# Patient Record
Sex: FEMALE | Race: WHITE
[De-identification: names, ages, dates, MRNs, and addresses within clinical notes are randomized per-mention and may not be internally consistent; named-entity substitution may affect disease eponyms.]

---

## 2017-06-27 ENCOUNTER — HOSPITAL ENCOUNTER (OUTPATIENT)
Dept: HOSPITAL 8 - CFH | Age: 82
Discharge: HOME | End: 2017-06-27
Attending: INTERNAL MEDICINE
Payer: MEDICARE

## 2017-06-27 DIAGNOSIS — I25.9: Primary | ICD-10-CM

## 2017-06-27 PROCEDURE — A9502 TC99M TETROFOSMIN: HCPCS

## 2017-06-27 PROCEDURE — 78452 HT MUSCLE IMAGE SPECT MULT: CPT

## 2017-06-27 PROCEDURE — 93017 CV STRESS TEST TRACING ONLY: CPT

## 2017-09-18 ENCOUNTER — HOSPITAL ENCOUNTER (OUTPATIENT)
Dept: HOSPITAL 8 - CFH | Age: 82
Discharge: HOME | End: 2017-09-18
Attending: INTERNAL MEDICINE
Payer: MEDICARE

## 2017-09-18 DIAGNOSIS — I48.2: Primary | ICD-10-CM

## 2017-09-18 LAB
HCT VFR BLD CALC: 53.7 % (ref 34.6–47.8)
HGB BLD-MCNC: 17.6 G/DL (ref 11.7–16.4)
WBC # BLD AUTO: 8 X10^3/UL (ref 3.4–10)

## 2017-09-18 PROCEDURE — 36415 COLL VENOUS BLD VENIPUNCTURE: CPT

## 2017-09-18 PROCEDURE — 85025 COMPLETE CBC W/AUTO DIFF WBC: CPT

## 2017-09-21 ENCOUNTER — HOSPITAL ENCOUNTER (OUTPATIENT)
Facility: MEDICAL CENTER | Age: 82
End: 2017-09-21
Attending: INTERNAL MEDICINE | Admitting: INTERNAL MEDICINE
Payer: MEDICARE

## 2017-09-21 VITALS
HEART RATE: 68 BPM | HEIGHT: 64 IN | RESPIRATION RATE: 14 BRPM | SYSTOLIC BLOOD PRESSURE: 149 MMHG | WEIGHT: 148 LBS | BODY MASS INDEX: 25.27 KG/M2 | TEMPERATURE: 99 F | OXYGEN SATURATION: 98 % | DIASTOLIC BLOOD PRESSURE: 64 MMHG

## 2017-09-21 PROBLEM — T82.191A MECHANICAL COMPLICATION OF CARDIAC PACEMAKER: Status: ACTIVE | Noted: 2017-09-21

## 2017-09-21 PROCEDURE — 305387 HCHG SUTURES

## 2017-09-21 PROCEDURE — 304853 HCHG PPM TEST CABLE

## 2017-09-21 PROCEDURE — 700101 HCHG RX REV CODE 250

## 2017-09-21 PROCEDURE — C1786 PMKR, SINGLE, RATE-RESP: HCPCS

## 2017-09-21 PROCEDURE — 160002 HCHG RECOVERY MINUTES (STAT)

## 2017-09-21 PROCEDURE — 306474 HCHG STAPLER,SKIN 35W PREM(CATHLAB)

## 2017-09-21 PROCEDURE — 700111 HCHG RX REV CODE 636 W/ 250 OVERRIDE (IP)

## 2017-09-21 PROCEDURE — 33227 REMOVE&REPLACE PM GEN SINGL: CPT

## 2017-09-21 PROCEDURE — 99152 MOD SED SAME PHYS/QHP 5/>YRS: CPT

## 2017-09-21 PROCEDURE — 304952 HCHG R 2 PADS

## 2017-09-21 RX ORDER — LIDOCAINE HYDROCHLORIDE 20 MG/ML
INJECTION, SOLUTION INFILTRATION; PERINEURAL
Status: COMPLETED
Start: 2017-09-21 | End: 2017-09-21

## 2017-09-21 RX ORDER — MIDAZOLAM HYDROCHLORIDE 1 MG/ML
INJECTION INTRAMUSCULAR; INTRAVENOUS
Status: COMPLETED
Start: 2017-09-21 | End: 2017-09-21

## 2017-09-21 RX ADMIN — LIDOCAINE HYDROCHLORIDE: 20 INJECTION, SOLUTION INFILTRATION; PERINEURAL at 07:23

## 2017-09-21 RX ADMIN — VANCOMYCIN HYDROCHLORIDE 2000 MG: 1 INJECTION, POWDER, LYOPHILIZED, FOR SOLUTION INTRAVENOUS at 08:25

## 2017-09-21 RX ADMIN — MIDAZOLAM 2 MG: 1 INJECTION INTRAMUSCULAR; INTRAVENOUS at 08:38

## 2017-09-21 RX ADMIN — FENTANYL CITRATE 50 MCG: 50 INJECTION, SOLUTION INTRAMUSCULAR; INTRAVENOUS at 08:38

## 2017-09-21 ASSESSMENT — PAIN SCALES - GENERAL
PAINLEVEL_OUTOF10: 0

## 2017-09-21 NOTE — PROCEDURES
DATE OF SERVICE:  09/21/2017    PROCEDURE:  Permanent pacemaker generator change.    INDICATION:  End of service generator.    NARRATIVE:  Patient was brought to the cath lab in a fasting state.  Informed   consent was obtained.  She was prepped and draped in usual sterile fashion.    Conscious sedation was employed.  Lidocaine 1% was used for local anesthesia.    The existing pocket was entered with a combination of sharp, blunt and Bovie   dissection.  The existing pulse generator was then removed from the pocket.    The lead was tested and confirmed of adequate pacing and sensing thresholds.    The pocket was flushed with antibiotic solution.  The lead was connected to   the new pulse generator.  The entire system was placed in the pocket.  The   wound was closed in 2 layers.  Upon completion of the case, all sponge and   needle counts correct.  The patient was taken back to her telemetry bed in   stable condition.    PULSE GENERATOR DATA:  The pulse generator is a Medtronic, serial #[id number].    LEAD DATA:  The ventricular lead is a Medtronic lead.  Date of original   implant is 11/22/2010, serial #[id number].  The R waves measured 18.5 millivolts.    The pacing threshold is 0.4 volts at 0.5 msec.  The right ventricular pacing   lead impedance is 420 ohms.    CONCLUSIONS:  1.  Successful pacemaker generator change.  2.  For program device, please refer to the pacemaker clinic chart.       ____________________________________     MD MAICOL SPAULDING / MEGHAN    DD:  09/21/2017 08:45:40  DT:  09/21/2017 09:00:20    D#:  [id number]  Job#:  [id number]

## 2017-09-21 NOTE — OR NURSING
0900   PATIENT RECEIVED FROM CATH LAB,  S/P GENERATOR CHANGED.  RIGHT SC INCISION WITH DRESSING INTACT,  VERY SMALL SPOT OF BLOOD DRAINAGE.   IS IN WAITING ROOM.    1000    PATIENT TAKING PO FLUID AND SNACK WELL.      1100  DC INSTRUCTIONS GIVEN TO PATIENT.  VERBALIZED UNDERSTANDING OF ALL.  HL DC.  PATIENT DC TO HOME,  VIA WC,  ESCORTED OUT BY VOLUNTEER.

## 2017-09-21 NOTE — DISCHARGE INSTRUCTIONS
ACTIVITY: Rest and take it easy for the first 24 hours.  A responsible adult is recommended to remain with you during that time.  It is normal to feel sleepy.  We encourage you to not do anything that requires balance, judgment or coordination.    MILD FLU-LIKE SYMPTOMS ARE NORMAL. YOU MAY EXPERIENCE GENERALIZED MUSCLE ACHES, THROAT IRRITATION, HEADACHE AND/OR SOME NAUSEA.    FOR 24 HOURS DO NOT:  Drive, operate machinery or run household appliances.  Drink beer or alcoholic beverages.   Make important decisions or sign legal documents.    SPECIAL INSTRUCTIONS: *KEEP INCISION DRY FOR 7 DAYS**    DIET: To avoid nausea, slowly advance diet as tolerated, avoiding spicy or greasy foods for the first day.  Add more substantial food to your diet according to your physician's instructions.  Babies can be fed formula or breast milk as soon as they are hungry.  INCREASE FLUIDS AND FIBER TO AVOID CONSTIPATION.    SURGICAL DRESSING/BATHING: *MAY SHOWER BUT NEED TO COVER UP SARAN WRAP**    FOLLOW-UP APPOINTMENT:  A follow-up appointment should be arranged with your doctor in *[facility]    [phone]**; call to schedule.    You should CALL YOUR PHYSICIAN if you develop:  Fever greater than 101 degrees F.  Pain not relieved by medication, or persistent nausea or vomiting.  Excessive bleeding (blood soaking through dressing) or unexpected drainage from the wound.  Extreme redness or swelling around the incision site, drainage of pus or foul smelling drainage.  Inability to urinate or empty your bladder within 8 hours.  Problems with breathing or chest pain.    You should call 911 if you develop problems with breathing or chest pain.  If you are unable to contact your doctor or surgical center, you should go to the nearest emergency room or urgent care center.  Physician's telephone #: *[phone]**    If any questions arise, call your doctor.  If your doctor is not available, please feel free to call the Surgical Center at  [phone].  The Center is open Monday through Friday from 7AM to 7PM.  You can also call the HEALTH HOTLINE open 24 hours/day, 7 days/week and speak to a nurse at [phone], or toll free at [phone].    A registered nurse may call you a few days after your surgery to see how you are doing after your procedure.    MEDICATIONS: Resume taking daily medication.  Take prescribed pain medication with food.  If no medication is prescribed, you may take non-aspirin pain medication if needed.  PAIN MEDICATION CAN BE VERY CONSTIPATING.  Take a stool softener or laxative such as senokot, pericolace, or milk of magnesia if needed.      If your physician has prescribed pain medication that includes Acetaminophen (Tylenol), do not take additional Acetaminophen (Tylenol) while taking the prescribed medication.    Depression / Suicide Risk    As you are discharged from this [facility] facility, it is important to learn how to keep safe from harming yourself.    Recognize the warning signs:  · Abrupt changes in personality, positive or negative- including increase in energy   · Giving away possessions  · Change in eating patterns- significant weight changes-  positive or negative  · Change in sleeping patterns- unable to sleep or sleeping all the time   · Unwillingness or inability to communicate  · Depression  · Unusual sadness, discouragement and loneliness  · Talk of wanting to die  · Neglect of personal appearance   · Rebelliousness- reckless behavior  · Withdrawal from people/activities they love  · Confusion- inability to concentrate     If you or a loved one observes any of these behaviors or has concerns about self-harm, here's what you can do:  · Talk about it- your feelings and reasons for harming yourself  · Remove any means that you might use to hurt yourself (examples: pills, rope, extension cords, firearm)  · Get professional help from the community (Mental Health, Substance Abuse, psychological  counseling)  · Do not be alone:Call your Safe Contact- someone whom you trust who will be there for you.  · Call your local CRISIS HOTLINE 379-7808 or 188-325-5949  · Call your local Children's Mobile Crisis Response Team [place] (752) 597-1903 or www.[employer]  · Call the toll free National Suicide Prevention Hotlines   · National Suicide Prevention Lifeline 637-149-PIZQ (9128)  · National Hope Line Network 800-SUICIDE (610-8419)

## 2018-07-10 ENCOUNTER — HOSPITAL ENCOUNTER (OUTPATIENT)
Dept: HOSPITAL 8 - CFH | Age: 83
End: 2018-07-10
Attending: INTERNAL MEDICINE
Payer: MEDICARE

## 2018-07-10 DIAGNOSIS — C50.919: ICD-10-CM

## 2018-07-10 DIAGNOSIS — I08.1: Primary | ICD-10-CM

## 2018-07-10 DIAGNOSIS — I10: ICD-10-CM

## 2018-07-10 DIAGNOSIS — I48.91: ICD-10-CM

## 2018-07-10 DIAGNOSIS — I47.2: ICD-10-CM

## 2018-07-10 PROCEDURE — 93306 TTE W/DOPPLER COMPLETE: CPT

## 2020-10-23 ENCOUNTER — APPOINTMENT (OUTPATIENT)
Dept: RADIOLOGY | Facility: MEDICAL CENTER | Age: 85
DRG: 481 | End: 2020-10-23
Attending: EMERGENCY MEDICINE
Payer: MEDICARE

## 2020-10-23 ENCOUNTER — APPOINTMENT (OUTPATIENT)
Dept: RADIOLOGY | Facility: MEDICAL CENTER | Age: 85
DRG: 481 | End: 2020-10-23
Attending: STUDENT IN AN ORGANIZED HEALTH CARE EDUCATION/TRAINING PROGRAM
Payer: MEDICARE

## 2020-10-23 ENCOUNTER — HOSPITAL ENCOUNTER (OUTPATIENT)
Dept: RADIOLOGY | Facility: MEDICAL CENTER | Age: 85
End: 2020-10-23
Payer: MEDICARE

## 2020-10-23 ENCOUNTER — HOSPITAL ENCOUNTER (INPATIENT)
Facility: MEDICAL CENTER | Age: 85
LOS: 4 days | DRG: 481 | End: 2020-10-27
Attending: EMERGENCY MEDICINE | Admitting: STUDENT IN AN ORGANIZED HEALTH CARE EDUCATION/TRAINING PROGRAM
Payer: MEDICARE

## 2020-10-23 ENCOUNTER — ANESTHESIA (OUTPATIENT)
Dept: SURGERY | Facility: MEDICAL CENTER | Age: 85
DRG: 481 | End: 2020-10-23
Payer: MEDICARE

## 2020-10-23 ENCOUNTER — ANESTHESIA EVENT (OUTPATIENT)
Dept: SURGERY | Facility: MEDICAL CENTER | Age: 85
DRG: 481 | End: 2020-10-23
Payer: MEDICARE

## 2020-10-23 DIAGNOSIS — S72.001A CLOSED FRACTURE OF RIGHT HIP, INITIAL ENCOUNTER (HCC): Primary | ICD-10-CM

## 2020-10-23 DIAGNOSIS — S72.141A CLOSED COMMINUTED INTERTROCHANTERIC FRACTURE OF RIGHT FEMUR, INITIAL ENCOUNTER (HCC): ICD-10-CM

## 2020-10-23 PROBLEM — J44.9 CHRONIC OBSTRUCTIVE PULMONARY DISEASE (COPD) (HCC): Status: ACTIVE | Noted: 2020-10-23

## 2020-10-23 LAB
ABO GROUP BLD: NORMAL
ALBUMIN SERPL BCP-MCNC: 3.7 G/DL (ref 3.2–4.9)
ALBUMIN/GLOB SERPL: 1.3 G/DL
ALP SERPL-CCNC: 57 U/L (ref 30–99)
ALT SERPL-CCNC: 20 U/L (ref 2–50)
ANION GAP SERPL CALC-SCNC: 10 MMOL/L (ref 7–16)
AST SERPL-CCNC: 22 U/L (ref 12–45)
BASOPHILS # BLD AUTO: 0.2 % (ref 0–1.8)
BASOPHILS # BLD: 0.03 K/UL (ref 0–0.12)
BILIRUB SERPL-MCNC: 0.6 MG/DL (ref 0.1–1.5)
BLD GP AB SCN SERPL QL: NORMAL
BUN SERPL-MCNC: 10 MG/DL (ref 8–22)
CALCIUM SERPL-MCNC: 9.3 MG/DL (ref 8.5–10.5)
CHLORIDE SERPL-SCNC: 104 MMOL/L (ref 96–112)
CO2 SERPL-SCNC: 27 MMOL/L (ref 20–33)
COVID ORDER STATUS COVID19: NORMAL
COVID ORDER STATUS COVID19: NORMAL
CREAT SERPL-MCNC: 0.37 MG/DL (ref 0.5–1.4)
DIGOXIN SERPL-MCNC: 0.6 NG/ML (ref 0.8–2)
EKG IMPRESSION: NORMAL
EOSINOPHIL # BLD AUTO: 0.03 K/UL (ref 0–0.51)
EOSINOPHIL NFR BLD: 0.2 % (ref 0–6.9)
ERYTHROCYTE [DISTWIDTH] IN BLOOD BY AUTOMATED COUNT: 53.7 FL (ref 35.9–50)
GLOBULIN SER CALC-MCNC: 2.9 G/DL (ref 1.9–3.5)
GLUCOSE SERPL-MCNC: 110 MG/DL (ref 65–99)
HCT VFR BLD AUTO: 51.9 % (ref 37–47)
HGB BLD-MCNC: 16.2 G/DL (ref 12–16)
IMM GRANULOCYTES # BLD AUTO: 0.05 K/UL (ref 0–0.11)
IMM GRANULOCYTES NFR BLD AUTO: 0.4 % (ref 0–0.9)
LYMPHOCYTES # BLD AUTO: 0.68 K/UL (ref 1–4.8)
LYMPHOCYTES NFR BLD: 5.5 % (ref 22–41)
MAGNESIUM SERPL-MCNC: 2.1 MG/DL (ref 1.5–2.5)
MCH RBC QN AUTO: 29.9 PG (ref 27–33)
MCHC RBC AUTO-ENTMCNC: 31.2 G/DL (ref 33.6–35)
MCV RBC AUTO: 95.9 FL (ref 81.4–97.8)
MONOCYTES # BLD AUTO: 0.74 K/UL (ref 0–0.85)
MONOCYTES NFR BLD AUTO: 5.9 % (ref 0–13.4)
NEUTROPHILS # BLD AUTO: 10.92 K/UL (ref 2–7.15)
NEUTROPHILS NFR BLD: 87.8 % (ref 44–72)
NRBC # BLD AUTO: 0 K/UL
NRBC BLD-RTO: 0 /100 WBC
NT-PROBNP SERPL IA-MCNC: 879 PG/ML (ref 0–125)
PLATELET # BLD AUTO: 202 K/UL (ref 164–446)
PMV BLD AUTO: 9.9 FL (ref 9–12.9)
POTASSIUM SERPL-SCNC: 4.5 MMOL/L (ref 3.6–5.5)
PROT SERPL-MCNC: 6.6 G/DL (ref 6–8.2)
RBC # BLD AUTO: 5.41 M/UL (ref 4.2–5.4)
RH BLD: NORMAL
SARS-COV+SARS-COV-2 AG RESP QL IA.RAPID: NOTDETECTED
SARS-COV-2 RNA RESP QL NAA+PROBE: NOTDETECTED
SODIUM SERPL-SCNC: 141 MMOL/L (ref 135–145)
SPECIMEN SOURCE: NORMAL
SPECIMEN SOURCE: NORMAL
WBC # BLD AUTO: 12.5 K/UL (ref 4.8–10.8)

## 2020-10-23 PROCEDURE — 160002 HCHG RECOVERY MINUTES (STAT): Performed by: STUDENT IN AN ORGANIZED HEALTH CARE EDUCATION/TRAINING PROGRAM

## 2020-10-23 PROCEDURE — 99285 EMERGENCY DEPT VISIT HI MDM: CPT

## 2020-10-23 PROCEDURE — 770006 HCHG ROOM/CARE - MED/SURG/GYN SEMI*

## 2020-10-23 PROCEDURE — 93005 ELECTROCARDIOGRAM TRACING: CPT | Performed by: EMERGENCY MEDICINE

## 2020-10-23 PROCEDURE — 160041 HCHG SURGERY MINUTES - EA ADDL 1 MIN LEVEL 4: Performed by: STUDENT IN AN ORGANIZED HEALTH CARE EDUCATION/TRAINING PROGRAM

## 2020-10-23 PROCEDURE — 160048 HCHG OR STATISTICAL LEVEL 1-5: Performed by: STUDENT IN AN ORGANIZED HEALTH CARE EDUCATION/TRAINING PROGRAM

## 2020-10-23 PROCEDURE — 501838 HCHG SUTURE GENERAL: Performed by: STUDENT IN AN ORGANIZED HEALTH CARE EDUCATION/TRAINING PROGRAM

## 2020-10-23 PROCEDURE — 36415 COLL VENOUS BLD VENIPUNCTURE: CPT

## 2020-10-23 PROCEDURE — 502000 HCHG MISC OR IMPLANTS RC 0278: Performed by: STUDENT IN AN ORGANIZED HEALTH CARE EDUCATION/TRAINING PROGRAM

## 2020-10-23 PROCEDURE — 160036 HCHG PACU - EA ADDL 30 MINS PHASE I: Performed by: STUDENT IN AN ORGANIZED HEALTH CARE EDUCATION/TRAINING PROGRAM

## 2020-10-23 PROCEDURE — 86900 BLOOD TYPING SEROLOGIC ABO: CPT

## 2020-10-23 PROCEDURE — 99221 1ST HOSP IP/OBS SF/LOW 40: CPT | Mod: AI | Performed by: STUDENT IN AN ORGANIZED HEALTH CARE EDUCATION/TRAINING PROGRAM

## 2020-10-23 PROCEDURE — 700101 HCHG RX REV CODE 250: Performed by: ANESTHESIOLOGY

## 2020-10-23 PROCEDURE — 73552 X-RAY EXAM OF FEMUR 2/>: CPT | Mod: RT

## 2020-10-23 PROCEDURE — 85025 COMPLETE CBC W/AUTO DIFF WBC: CPT

## 2020-10-23 PROCEDURE — 72170 X-RAY EXAM OF PELVIS: CPT

## 2020-10-23 PROCEDURE — U0003 INFECTIOUS AGENT DETECTION BY NUCLEIC ACID (DNA OR RNA); SEVERE ACUTE RESPIRATORY SYNDROME CORONAVIRUS 2 (SARS-COV-2) (CORONAVIRUS DISEASE [COVID-19]), AMPLIFIED PROBE TECHNIQUE, MAKING USE OF HIGH THROUGHPUT TECHNOLOGIES AS DESCRIBED BY CMS-2020-01-R: HCPCS

## 2020-10-23 PROCEDURE — 70450 CT HEAD/BRAIN W/O DYE: CPT

## 2020-10-23 PROCEDURE — 87426 SARSCOV CORONAVIRUS AG IA: CPT

## 2020-10-23 PROCEDURE — 0QS636Z REPOSITION RIGHT UPPER FEMUR WITH INTRAMEDULLARY INTERNAL FIXATION DEVICE, PERCUTANEOUS APPROACH: ICD-10-PCS | Performed by: STUDENT IN AN ORGANIZED HEALTH CARE EDUCATION/TRAINING PROGRAM

## 2020-10-23 PROCEDURE — 80162 ASSAY OF DIGOXIN TOTAL: CPT

## 2020-10-23 PROCEDURE — 86850 RBC ANTIBODY SCREEN: CPT

## 2020-10-23 PROCEDURE — 94760 N-INVAS EAR/PLS OXIMETRY 1: CPT

## 2020-10-23 PROCEDURE — 160035 HCHG PACU - 1ST 60 MINS PHASE I: Performed by: STUDENT IN AN ORGANIZED HEALTH CARE EDUCATION/TRAINING PROGRAM

## 2020-10-23 PROCEDURE — C1713 ANCHOR/SCREW BN/BN,TIS/BN: HCPCS | Performed by: STUDENT IN AN ORGANIZED HEALTH CARE EDUCATION/TRAINING PROGRAM

## 2020-10-23 PROCEDURE — 700105 HCHG RX REV CODE 258: Performed by: ANESTHESIOLOGY

## 2020-10-23 PROCEDURE — 160029 HCHG SURGERY MINUTES - 1ST 30 MINS LEVEL 4: Performed by: STUDENT IN AN ORGANIZED HEALTH CARE EDUCATION/TRAINING PROGRAM

## 2020-10-23 PROCEDURE — 160009 HCHG ANES TIME/MIN: Performed by: STUDENT IN AN ORGANIZED HEALTH CARE EDUCATION/TRAINING PROGRAM

## 2020-10-23 PROCEDURE — 83880 ASSAY OF NATRIURETIC PEPTIDE: CPT

## 2020-10-23 PROCEDURE — 71045 X-RAY EXAM CHEST 1 VIEW: CPT

## 2020-10-23 PROCEDURE — 93010 ELECTROCARDIOGRAM REPORT: CPT | Performed by: INTERNAL MEDICINE

## 2020-10-23 PROCEDURE — 80053 COMPREHEN METABOLIC PANEL: CPT

## 2020-10-23 PROCEDURE — 86901 BLOOD TYPING SEROLOGIC RH(D): CPT

## 2020-10-23 PROCEDURE — C9803 HOPD COVID-19 SPEC COLLECT: HCPCS | Performed by: INTERNAL MEDICINE

## 2020-10-23 PROCEDURE — 700111 HCHG RX REV CODE 636 W/ 250 OVERRIDE (IP): Performed by: ANESTHESIOLOGY

## 2020-10-23 PROCEDURE — 83735 ASSAY OF MAGNESIUM: CPT

## 2020-10-23 PROCEDURE — C9803 HOPD COVID-19 SPEC COLLECT: HCPCS | Performed by: STUDENT IN AN ORGANIZED HEALTH CARE EDUCATION/TRAINING PROGRAM

## 2020-10-23 PROCEDURE — 502240 HCHG MISC OR SUPPLY RC 0272: Performed by: STUDENT IN AN ORGANIZED HEALTH CARE EDUCATION/TRAINING PROGRAM

## 2020-10-23 DEVICE — SCREW FOR IM NAILS TI LOCKING WITH T25 STARDRIVE 5.0MM 32MM (2TX2=4): Type: IMPLANTABLE DEVICE | Site: HIP | Status: FUNCTIONAL

## 2020-10-23 DEVICE — NAIL STERILE TI CANN TFNA 10MM 125 DEG 170MM: Type: IMPLANTABLE DEVICE | Site: HIP | Status: FUNCTIONAL

## 2020-10-23 DEVICE — IMPLANTABLE DEVICE: Type: IMPLANTABLE DEVICE | Site: HIP | Status: FUNCTIONAL

## 2020-10-23 RX ORDER — CEFAZOLIN SODIUM 2 G/100ML
2 INJECTION, SOLUTION INTRAVENOUS EVERY 8 HOURS
Status: COMPLETED | OUTPATIENT
Start: 2020-10-24 | End: 2020-10-24

## 2020-10-23 RX ORDER — CEFAZOLIN SODIUM 1 G/3ML
INJECTION, POWDER, FOR SOLUTION INTRAMUSCULAR; INTRAVENOUS PRN
Status: DISCONTINUED | OUTPATIENT
Start: 2020-10-23 | End: 2020-10-23 | Stop reason: SURG

## 2020-10-23 RX ORDER — SODIUM CHLORIDE, SODIUM LACTATE, POTASSIUM CHLORIDE, CALCIUM CHLORIDE 600; 310; 30; 20 MG/100ML; MG/100ML; MG/100ML; MG/100ML
INJECTION, SOLUTION INTRAVENOUS
Status: DISCONTINUED | OUTPATIENT
Start: 2020-10-23 | End: 2020-10-23 | Stop reason: SURG

## 2020-10-23 RX ORDER — HALOPERIDOL 5 MG/ML
INJECTION INTRAMUSCULAR PRN
Status: DISCONTINUED | OUTPATIENT
Start: 2020-10-23 | End: 2020-10-23 | Stop reason: SURG

## 2020-10-23 RX ORDER — FUROSEMIDE 20 MG/1
20 TABLET ORAL DAILY
Status: DISCONTINUED | OUTPATIENT
Start: 2020-10-24 | End: 2020-10-27 | Stop reason: HOSPADM

## 2020-10-23 RX ORDER — ROCURONIUM BROMIDE 10 MG/ML
INJECTION, SOLUTION INTRAVENOUS PRN
Status: DISCONTINUED | OUTPATIENT
Start: 2020-10-23 | End: 2020-10-23 | Stop reason: SURG

## 2020-10-23 RX ORDER — HYDROMORPHONE HYDROCHLORIDE 1 MG/ML
0.2 INJECTION, SOLUTION INTRAMUSCULAR; INTRAVENOUS; SUBCUTANEOUS
Status: DISCONTINUED | OUTPATIENT
Start: 2020-10-23 | End: 2020-10-24 | Stop reason: HOSPADM

## 2020-10-23 RX ORDER — HYDROMORPHONE HYDROCHLORIDE 1 MG/ML
0.4 INJECTION, SOLUTION INTRAMUSCULAR; INTRAVENOUS; SUBCUTANEOUS
Status: DISCONTINUED | OUTPATIENT
Start: 2020-10-23 | End: 2020-10-24 | Stop reason: HOSPADM

## 2020-10-23 RX ORDER — DIGOXIN 125 MCG
125 TABLET ORAL
Status: DISCONTINUED | OUTPATIENT
Start: 2020-10-24 | End: 2020-10-27 | Stop reason: HOSPADM

## 2020-10-23 RX ORDER — IPRATROPIUM BROMIDE AND ALBUTEROL SULFATE 2.5; .5 MG/3ML; MG/3ML
3 SOLUTION RESPIRATORY (INHALATION)
Status: DISCONTINUED | OUTPATIENT
Start: 2020-10-23 | End: 2020-10-24 | Stop reason: HOSPADM

## 2020-10-23 RX ORDER — POLYETHYLENE GLYCOL 3350 17 G/17G
1 POWDER, FOR SOLUTION ORAL
Status: DISCONTINUED | OUTPATIENT
Start: 2020-10-23 | End: 2020-10-27 | Stop reason: HOSPADM

## 2020-10-23 RX ORDER — HYDROMORPHONE HYDROCHLORIDE 1 MG/ML
0.5 INJECTION, SOLUTION INTRAMUSCULAR; INTRAVENOUS; SUBCUTANEOUS
Status: DISCONTINUED | OUTPATIENT
Start: 2020-10-23 | End: 2020-10-27 | Stop reason: HOSPADM

## 2020-10-23 RX ORDER — DIGOXIN 250 MCG
250 TABLET ORAL
Status: DISCONTINUED | OUTPATIENT
Start: 2020-10-23 | End: 2020-10-27 | Stop reason: HOSPADM

## 2020-10-23 RX ORDER — HYDROMORPHONE HYDROCHLORIDE 1 MG/ML
0.1 INJECTION, SOLUTION INTRAMUSCULAR; INTRAVENOUS; SUBCUTANEOUS
Status: DISCONTINUED | OUTPATIENT
Start: 2020-10-23 | End: 2020-10-24 | Stop reason: HOSPADM

## 2020-10-23 RX ORDER — ONDANSETRON 2 MG/ML
4 INJECTION INTRAMUSCULAR; INTRAVENOUS
Status: DISCONTINUED | OUTPATIENT
Start: 2020-10-23 | End: 2020-10-24 | Stop reason: HOSPADM

## 2020-10-23 RX ORDER — LISINOPRIL 20 MG/1
20 TABLET ORAL 2 TIMES DAILY
Status: DISCONTINUED | OUTPATIENT
Start: 2020-10-23 | End: 2020-10-24

## 2020-10-23 RX ORDER — BISACODYL 10 MG
10 SUPPOSITORY, RECTAL RECTAL
Status: DISCONTINUED | OUTPATIENT
Start: 2020-10-23 | End: 2020-10-27 | Stop reason: HOSPADM

## 2020-10-23 RX ORDER — ACETAMINOPHEN 325 MG/1
650 TABLET ORAL EVERY 6 HOURS PRN
Status: DISCONTINUED | OUTPATIENT
Start: 2020-10-23 | End: 2020-10-27 | Stop reason: HOSPADM

## 2020-10-23 RX ORDER — DILTIAZEM HYDROCHLORIDE 240 MG/1
240 CAPSULE, COATED, EXTENDED RELEASE ORAL EVERY EVENING
Status: DISCONTINUED | OUTPATIENT
Start: 2020-10-23 | End: 2020-10-24

## 2020-10-23 RX ORDER — LIDOCAINE HYDROCHLORIDE 20 MG/ML
INJECTION, SOLUTION EPIDURAL; INFILTRATION; INTRACAUDAL; PERINEURAL PRN
Status: DISCONTINUED | OUTPATIENT
Start: 2020-10-23 | End: 2020-10-23 | Stop reason: SURG

## 2020-10-23 RX ORDER — AMOXICILLIN 250 MG
2 CAPSULE ORAL 2 TIMES DAILY
Status: DISCONTINUED | OUTPATIENT
Start: 2020-10-23 | End: 2020-10-27 | Stop reason: HOSPADM

## 2020-10-23 RX ORDER — PHENYLEPHRINE HCL IN 0.9% NACL 0.5 MG/5ML
SYRINGE (ML) INTRAVENOUS PRN
Status: DISCONTINUED | OUTPATIENT
Start: 2020-10-23 | End: 2020-10-23 | Stop reason: SURG

## 2020-10-23 RX ORDER — ONDANSETRON 2 MG/ML
4 INJECTION INTRAMUSCULAR; INTRAVENOUS ONCE
Status: COMPLETED | OUTPATIENT
Start: 2020-10-23 | End: 2020-10-24

## 2020-10-23 RX ORDER — OXYCODONE HCL 5 MG/5 ML
5 SOLUTION, ORAL ORAL
Status: DISCONTINUED | OUTPATIENT
Start: 2020-10-23 | End: 2020-10-24 | Stop reason: HOSPADM

## 2020-10-23 RX ORDER — OXYCODONE HCL 5 MG/5 ML
10 SOLUTION, ORAL ORAL
Status: DISCONTINUED | OUTPATIENT
Start: 2020-10-23 | End: 2020-10-24 | Stop reason: HOSPADM

## 2020-10-23 RX ORDER — HALOPERIDOL 5 MG/ML
1 INJECTION INTRAMUSCULAR
Status: DISCONTINUED | OUTPATIENT
Start: 2020-10-23 | End: 2020-10-24 | Stop reason: HOSPADM

## 2020-10-23 RX ADMIN — Medication 200 MCG: at 21:34

## 2020-10-23 RX ADMIN — CEFAZOLIN 2 G: 330 INJECTION, POWDER, FOR SOLUTION INTRAMUSCULAR; INTRAVENOUS at 21:23

## 2020-10-23 RX ADMIN — Medication 300 MCG: at 21:38

## 2020-10-23 RX ADMIN — FENTANYL CITRATE 250 MCG: 50 INJECTION, SOLUTION INTRAMUSCULAR; INTRAVENOUS at 21:45

## 2020-10-23 RX ADMIN — PROPOFOL 100 MG: 10 INJECTION, EMULSION INTRAVENOUS at 21:23

## 2020-10-23 RX ADMIN — ROCURONIUM BROMIDE 50 MG: 10 INJECTION, SOLUTION INTRAVENOUS at 21:30

## 2020-10-23 RX ADMIN — PROPOFOL 100 MG: 10 INJECTION, EMULSION INTRAVENOUS at 21:30

## 2020-10-23 RX ADMIN — HALOPERIDOL LACTATE 1 MG: 5 INJECTION, SOLUTION INTRAMUSCULAR at 21:23

## 2020-10-23 RX ADMIN — PHENYLEPHRINE HYDROCHLORIDE 80 MCG/MIN: 10 INJECTION INTRAVENOUS at 21:38

## 2020-10-23 RX ADMIN — LIDOCAINE HYDROCHLORIDE 60 MG: 20 INJECTION, SOLUTION EPIDURAL; INFILTRATION; INTRACAUDAL at 21:23

## 2020-10-23 RX ADMIN — SUGAMMADEX 200 MG: 100 INJECTION, SOLUTION INTRAVENOUS at 23:13

## 2020-10-23 RX ADMIN — Medication 200 MCG: at 21:33

## 2020-10-23 RX ADMIN — SODIUM CHLORIDE, POTASSIUM CHLORIDE, SODIUM LACTATE AND CALCIUM CHLORIDE: 600; 310; 30; 20 INJECTION, SOLUTION INTRAVENOUS at 21:20

## 2020-10-23 ASSESSMENT — PAIN SCALES - GENERAL: PAIN_LEVEL: 1

## 2020-10-24 PROBLEM — I50.9 CHF (CONGESTIVE HEART FAILURE) (HCC): Status: ACTIVE | Noted: 2020-10-24

## 2020-10-24 LAB
ABO + RH BLD: NORMAL
ERYTHROCYTE [DISTWIDTH] IN BLOOD BY AUTOMATED COUNT: 54.7 FL (ref 35.9–50)
HCT VFR BLD AUTO: 43.9 % (ref 37–47)
HGB BLD-MCNC: 13.7 G/DL (ref 12–16)
MCH RBC QN AUTO: 30.6 PG (ref 27–33)
MCHC RBC AUTO-ENTMCNC: 31.2 G/DL (ref 33.6–35)
MCV RBC AUTO: 98 FL (ref 81.4–97.8)
PLATELET # BLD AUTO: 197 K/UL (ref 164–446)
PMV BLD AUTO: 9.9 FL (ref 9–12.9)
RBC # BLD AUTO: 4.48 M/UL (ref 4.2–5.4)
WBC # BLD AUTO: 14.5 K/UL (ref 4.8–10.8)

## 2020-10-24 PROCEDURE — A9270 NON-COVERED ITEM OR SERVICE: HCPCS | Performed by: STUDENT IN AN ORGANIZED HEALTH CARE EDUCATION/TRAINING PROGRAM

## 2020-10-24 PROCEDURE — 97165 OT EVAL LOW COMPLEX 30 MIN: CPT

## 2020-10-24 PROCEDURE — 700111 HCHG RX REV CODE 636 W/ 250 OVERRIDE (IP): Performed by: STUDENT IN AN ORGANIZED HEALTH CARE EDUCATION/TRAINING PROGRAM

## 2020-10-24 PROCEDURE — 90662 IIV NO PRSV INCREASED AG IM: CPT | Performed by: STUDENT IN AN ORGANIZED HEALTH CARE EDUCATION/TRAINING PROGRAM

## 2020-10-24 PROCEDURE — 3E02340 INTRODUCTION OF INFLUENZA VACCINE INTO MUSCLE, PERCUTANEOUS APPROACH: ICD-10-PCS | Performed by: STUDENT IN AN ORGANIZED HEALTH CARE EDUCATION/TRAINING PROGRAM

## 2020-10-24 PROCEDURE — 700105 HCHG RX REV CODE 258

## 2020-10-24 PROCEDURE — 700102 HCHG RX REV CODE 250 W/ 637 OVERRIDE(OP): Performed by: STUDENT IN AN ORGANIZED HEALTH CARE EDUCATION/TRAINING PROGRAM

## 2020-10-24 PROCEDURE — 99232 SBSQ HOSP IP/OBS MODERATE 35: CPT | Performed by: STUDENT IN AN ORGANIZED HEALTH CARE EDUCATION/TRAINING PROGRAM

## 2020-10-24 PROCEDURE — 73502 X-RAY EXAM HIP UNI 2-3 VIEWS: CPT | Mod: RT

## 2020-10-24 PROCEDURE — 85027 COMPLETE CBC AUTOMATED: CPT

## 2020-10-24 PROCEDURE — 36415 COLL VENOUS BLD VENIPUNCTURE: CPT

## 2020-10-24 PROCEDURE — 700111 HCHG RX REV CODE 636 W/ 250 OVERRIDE (IP): Performed by: EMERGENCY MEDICINE

## 2020-10-24 PROCEDURE — 97162 PT EVAL MOD COMPLEX 30 MIN: CPT

## 2020-10-24 PROCEDURE — 770006 HCHG ROOM/CARE - MED/SURG/GYN SEMI*

## 2020-10-24 PROCEDURE — 90471 IMMUNIZATION ADMIN: CPT

## 2020-10-24 RX ORDER — LEVALBUTEROL INHALATION SOLUTION 0.63 MG/3ML
0.63 SOLUTION RESPIRATORY (INHALATION)
Status: DISCONTINUED | OUTPATIENT
Start: 2020-10-24 | End: 2020-10-27 | Stop reason: HOSPADM

## 2020-10-24 RX ORDER — SODIUM CHLORIDE 9 MG/ML
INJECTION, SOLUTION INTRAVENOUS
Status: COMPLETED
Start: 2020-10-24 | End: 2020-10-24

## 2020-10-24 RX ADMIN — ACETAMINOPHEN 650 MG: 325 TABLET, FILM COATED ORAL at 14:40

## 2020-10-24 RX ADMIN — CEFAZOLIN SODIUM 2 G: 2 INJECTION, SOLUTION INTRAVENOUS at 23:09

## 2020-10-24 RX ADMIN — LEVOTHYROXINE SODIUM 100 MCG: 25 TABLET ORAL at 05:15

## 2020-10-24 RX ADMIN — UMECLIDINIUM BROMIDE AND VILANTEROL TRIFENATATE 1 PUFF: 62.5; 25 POWDER RESPIRATORY (INHALATION) at 05:14

## 2020-10-24 RX ADMIN — SODIUM CHLORIDE 500 ML: 9 INJECTION, SOLUTION INTRAVENOUS at 23:17

## 2020-10-24 RX ADMIN — ACETAMINOPHEN 650 MG: 325 TABLET, FILM COATED ORAL at 21:01

## 2020-10-24 RX ADMIN — ACETAMINOPHEN 650 MG: 325 TABLET, FILM COATED ORAL at 05:56

## 2020-10-24 RX ADMIN — CEFAZOLIN SODIUM 2 G: 2 INJECTION, SOLUTION INTRAVENOUS at 05:13

## 2020-10-24 RX ADMIN — DIGOXIN 125 MCG: 125 TABLET ORAL at 17:04

## 2020-10-24 RX ADMIN — FUROSEMIDE 20 MG: 20 TABLET ORAL at 05:14

## 2020-10-24 RX ADMIN — INFLUENZA A VIRUS A/MICHIGAN/45/2015 X-275 (H1N1) ANTIGEN (FORMALDEHYDE INACTIVATED), INFLUENZA A VIRUS A/SINGAPORE/INFIMH-16-0019/2016 IVR-186 (H3N2) ANTIGEN (FORMALDEHYDE INACTIVATED), INFLUENZA B VIRUS B/PHUKET/3073/2013 ANTIGEN (FORMALDEHYDE INACTIVATED), AND INFLUENZA B VIRUS B/MARYLAND/15/2016 BX-69A ANTIGEN (FORMALDEHYDE INACTIVATED) 0.7 ML: 60; 60; 60; 60 INJECTION, SUSPENSION INTRAMUSCULAR at 21:02

## 2020-10-24 RX ADMIN — RIVAROXABAN 20 MG: 20 TABLET, FILM COATED ORAL at 17:03

## 2020-10-24 RX ADMIN — DOCUSATE SODIUM 50 MG AND SENNOSIDES 8.6 MG 2 TABLET: 8.6; 5 TABLET, FILM COATED ORAL at 05:14

## 2020-10-24 RX ADMIN — DOCUSATE SODIUM 50 MG AND SENNOSIDES 8.6 MG 2 TABLET: 8.6; 5 TABLET, FILM COATED ORAL at 17:04

## 2020-10-24 RX ADMIN — CEFAZOLIN SODIUM 2 G: 2 INJECTION, SOLUTION INTRAVENOUS at 14:41

## 2020-10-24 RX ADMIN — ONDANSETRON 4 MG: 2 INJECTION INTRAMUSCULAR; INTRAVENOUS at 01:53

## 2020-10-24 RX ADMIN — HYDROMORPHONE HYDROCHLORIDE 0.5 MG: 1 INJECTION, SOLUTION INTRAMUSCULAR; INTRAVENOUS; SUBCUTANEOUS at 01:11

## 2020-10-24 SDOH — ECONOMIC STABILITY: FOOD INSECURITY: WITHIN THE PAST 12 MONTHS, YOU WORRIED THAT YOUR FOOD WOULD RUN OUT BEFORE YOU GOT MONEY TO BUY MORE.: NEVER TRUE

## 2020-10-24 SDOH — HEALTH STABILITY: MENTAL HEALTH: HOW OFTEN DO YOU HAVE A DRINK CONTAINING ALCOHOL?: 2-3 TIMES A WEEK

## 2020-10-24 SDOH — ECONOMIC STABILITY: FOOD INSECURITY: WITHIN THE PAST 12 MONTHS, THE FOOD YOU BOUGHT JUST DIDN'T LAST AND YOU DIDN'T HAVE MONEY TO GET MORE.: NEVER TRUE

## 2020-10-24 SDOH — HEALTH STABILITY: MENTAL HEALTH: HOW MANY STANDARD DRINKS CONTAINING ALCOHOL DO YOU HAVE ON A TYPICAL DAY?: 1 OR 2

## 2020-10-24 SDOH — ECONOMIC STABILITY: TRANSPORTATION INSECURITY
IN THE PAST 12 MONTHS, HAS THE LACK OF TRANSPORTATION KEPT YOU FROM MEDICAL APPOINTMENTS OR FROM GETTING MEDICATIONS?: NO

## 2020-10-24 SDOH — HEALTH STABILITY: MENTAL HEALTH: HOW OFTEN DO YOU HAVE 6 OR MORE DRINKS ON ONE OCCASION?: NEVER

## 2020-10-24 SDOH — ECONOMIC STABILITY: TRANSPORTATION INSECURITY
IN THE PAST 12 MONTHS, HAS LACK OF TRANSPORTATION KEPT YOU FROM MEETINGS, WORK, OR FROM GETTING THINGS NEEDED FOR DAILY LIVING?: NO

## 2020-10-24 ASSESSMENT — LIFESTYLE VARIABLES
HAVE PEOPLE ANNOYED YOU BY CRITICIZING YOUR DRINKING: NO
TOTAL SCORE: 0
CONSUMPTION TOTAL: POSITIVE
DOES PATIENT WANT TO STOP DRINKING: NO
TOTAL SCORE: 0
EVER FELT BAD OR GUILTY ABOUT YOUR DRINKING: NO
EVER HAD A DRINK FIRST THING IN THE MORNING TO STEADY YOUR NERVES TO GET RID OF A HANGOVER: NO
ON A TYPICAL DAY WHEN YOU DRINK ALCOHOL HOW MANY DRINKS DO YOU HAVE: 3
ALCOHOL_USE: YES
AVERAGE NUMBER OF DAYS PER WEEK YOU HAVE A DRINK CONTAINING ALCOHOL: 5
TOTAL SCORE: 0
HOW MANY TIMES IN THE PAST YEAR HAVE YOU HAD 5 OR MORE DRINKS IN A DAY: 0
HAVE YOU EVER FELT YOU SHOULD CUT DOWN ON YOUR DRINKING: NO

## 2020-10-24 ASSESSMENT — FIBROSIS 4 INDEX
FIB4 SCORE: 2.1
FIB4 SCORE: 2.05
FIB4 SCORE: 2.05

## 2020-10-24 ASSESSMENT — PAIN DESCRIPTION - PAIN TYPE
TYPE: SURGICAL PAIN
TYPE: ACUTE PAIN;SURGICAL PAIN
TYPE: SURGICAL PAIN
TYPE: SURGICAL PAIN
TYPE: ACUTE PAIN
TYPE: SURGICAL PAIN
TYPE: ACUTE PAIN
TYPE: SURGICAL PAIN
TYPE: SURGICAL PAIN

## 2020-10-24 ASSESSMENT — CHA2DS2 SCORE
SEX: FEMALE
PRIOR STROKE OR TIA OR THROMBOEMBOLISM: NO
AGE 75 OR GREATER: YES
VASCULAR DISEASE: YES
HYPERTENSION: NO
CHF OR LEFT VENTRICULAR DYSFUNCTION: YES
AGE 65 TO 74: NO
CHA2DS2 VASC SCORE: 5
DIABETES: NO

## 2020-10-24 ASSESSMENT — COGNITIVE AND FUNCTIONAL STATUS - GENERAL
MOVING TO AND FROM BED TO CHAIR: A LITTLE
MOVING FROM LYING ON BACK TO SITTING ON SIDE OF FLAT BED: A LITTLE
MOVING FROM LYING ON BACK TO SITTING ON SIDE OF FLAT BED: A LOT
HELP NEEDED FOR BATHING: A LOT
DAILY ACTIVITIY SCORE: 17
TOILETING: A LITTLE
TURNING FROM BACK TO SIDE WHILE IN FLAT BAD: A LITTLE
MOVING TO AND FROM BED TO CHAIR: A LOT
WALKING IN HOSPITAL ROOM: A LITTLE
STANDING UP FROM CHAIR USING ARMS: A LITTLE
SUGGESTED CMS G CODE MODIFIER MOBILITY: CK
TURNING FROM BACK TO SIDE WHILE IN FLAT BAD: A LOT
DRESSING REGULAR LOWER BODY CLOTHING: A LOT
DRESSING REGULAR UPPER BODY CLOTHING: A LITTLE
STANDING UP FROM CHAIR USING ARMS: A LITTLE
SUGGESTED CMS G CODE MODIFIER DAILY ACTIVITY: CK
DRESSING REGULAR UPPER BODY CLOTHING: A LITTLE
WALKING IN HOSPITAL ROOM: A LOT
CLIMB 3 TO 5 STEPS WITH RAILING: A LITTLE
SUGGESTED CMS G CODE MODIFIER DAILY ACTIVITY: CK
TOILETING: A LITTLE
DRESSING REGULAR LOWER BODY CLOTHING: A LOT
DAILY ACTIVITIY SCORE: 18
PERSONAL GROOMING: A LITTLE
MOBILITY SCORE: 15
MOBILITY SCORE: 16
CLIMB 3 TO 5 STEPS WITH RAILING: A LOT
SUGGESTED CMS G CODE MODIFIER MOBILITY: CK
HELP NEEDED FOR BATHING: A LOT

## 2020-10-24 ASSESSMENT — PATIENT HEALTH QUESTIONNAIRE - PHQ9
1. LITTLE INTEREST OR PLEASURE IN DOING THINGS: NOT AT ALL
SUM OF ALL RESPONSES TO PHQ9 QUESTIONS 1 AND 2: 0
2. FEELING DOWN, DEPRESSED, IRRITABLE, OR HOPELESS: NOT AT ALL

## 2020-10-24 ASSESSMENT — GAIT ASSESSMENTS
GAIT LEVEL OF ASSIST: MINIMAL ASSIST
DISTANCE (FEET): 18
DEVIATION: STEP TO;DECREASED BASE OF SUPPORT;DECREASED HEEL STRIKE;DECREASED TOE OFF;OTHER (COMMENT)
ASSISTIVE DEVICE: FRONT WHEEL WALKER

## 2020-10-24 ASSESSMENT — ACTIVITIES OF DAILY LIVING (ADL): TOILETING: INDEPENDENT

## 2020-10-24 NOTE — CONSULTS
10/23/2020      85 y.o. female with past medical history of Atrial fibrillation on Xarelto (last dose last night), status post pacemaker, COPD on home oxygen 2 L, hypertension, hypothyroidism, CHF, bilateral total knee arthroplasty who presented 10/23/2020 as a transfer from [facility] status post mechanical fall while at [employer] on [employer], miscalculated height and landed on right hip.  Patient states the pain was sharp, localized to hip diagnostic pelvis x-ray showed comminuted, displaced intertrochanteric right femoral neck fracture.  denies knee pain or ankle pain. No pain in any other extremity. Denies numbness or tingling. No CP/SOB         Past Medical History:   Diagnosis Date   • Arrhythmia     atrial fibrillation; Cardiologist: Dr. Waters   • Arthritis    • Cancer (HCC) 2001    left breast   • Hypertension    • Unspecified disorder of thyroid     hypothyroid       Past Surgical History:   Procedure Laterality Date   • RECOVERY  11/22/2010    Performed by SURGERY, CATH-RECOVERY at SURGERY SAME DAY [facility] ORS   • KNEE ARTHROPLASTY TOTAL  9/28/2010    Performed by MASOUD CALHOUN at SURGERY [facility] ORS   • CARPAL TUNNEL RELEASE  2009    right   • LUMPECTOMY  2001    left breast   • KNEE ARTHROPLASTY TOTAL  1998    right   • GANGLION EXCISION  1995    right wrist   • GANGLION EXCISION  1995    left foot   • TONSILLECTOMY  as child       Medications  No current facility-administered medications on file prior to encounter.      Current Outpatient Medications on File Prior to Encounter   Medication Sig Dispense Refill   • rivaroxaban (XARELTO) 20 MG Tab tablet Take 20 mg by mouth with dinner.     • Multiple Vitamins-Minerals (ZINC PO) Take 1 Tab by mouth every morning.     • VITAMIN K PO Take 1 Tab by mouth every morning.     • VITAMIN D PO Take 1 Tab by mouth every morning.     • Ascorbic Acid (VITAMIN C PO) Take 1 Tab by mouth every morning.     • umeclidinium-vilanterol (ANORO ELLIPTA)  62.5-25 MCG/INH AEROSOL POWDER, BREATH ACTIVATED inhaler Inhale 1 Puff by mouth every day.     • levothyroxine (LEVOXYL) 100 MCG TABS Take 1 Tab by mouth every day. 90 Tab 4   • digoxin (LANOXIN) 0.125 MG TABS Take 1 Tab by mouth every 48 hours as needed. (Patient taking differently: Take 125 mcg by mouth every 48 hours.) 90 Each 3   • digoxin (LANOXIN) 0.25 MG TABS Take 1 Tab by mouth every 48 hours as needed. (Patient taking differently: Take 250 mcg by mouth every 48 hours.) 90 Each 3   • furosemide (LASIX) 20 MG TABS Take 1 Tab by mouth every day. 90 Each 3   • lisinopril (PRINIVIL) 20 MG TABS Take 1 Tab by mouth 2 Times a Day. 180 Each 11   • diltiazem CD (CARDIZEM CD) 240 MG CP24 Take 1 Cap by mouth 2 Times a Day. (Patient taking differently: Take 240 mg by mouth every evening.) 180 Cap 3   • B Complex Vitamins (VITAMIN B COMPLEX PO) Take 1 Tab by mouth every morning.     • Omega-3 Fatty Acids (OMEGA 3 PO) Take 1 Cap by mouth every 48 hours.     • Multiple Vitamin (MULTIVITAMIN PO) Take 1 Tab by mouth every morning.         Allergies  Other drug, Latex, Metoprolol succinate er, Penicillins, and Tape    ROS   All other systems were reviewed and found to be negative    Family History   Problem Relation Age of Onset   • Heart Disease Other    • Hypertension Other    • Cancer Other        Social History     Socioeconomic History   • Marital status:      Spouse name: Not on file   • Number of children: Not on file   • Years of education: Not on file   • Highest education level: Not on file   Occupational History   • Not on file   Social Needs   • Financial resource strain: Not on file   • Food insecurity     Worry: Not on file     Inability: Not on file   • Transportation needs     Medical: Not on file     Non-medical: Not on file   Tobacco Use   • Smoking status: Former Smoker     Packs/day: 1.00     Years: 20.00     Pack years: 20.00     Types: Cigarettes     Quit date: 1/1/1998     Years since quitting:  "22.8   • Smokeless tobacco: Never Used   Substance and Sexual Activity   • Alcohol use: Yes     Comment: 4 per week   • Drug use: No   • Sexual activity: Not on file   Lifestyle   • Physical activity     Days per week: Not on file     Minutes per session: Not on file   • Stress: Not on file   Relationships   • Social connections     Talks on phone: Not on file     Gets together: Not on file     Attends Mandaeism service: Not on file     Active member of club or organization: Not on file     Attends meetings of clubs or organizations: Not on file     Relationship status: Not on file   • Intimate partner violence     Fear of current or ex partner: Not on file     Emotionally abused: Not on file     Physically abused: Not on file     Forced sexual activity: Not on file   Other Topics Concern   • Not on file   Social History Narrative   • Not on file       Physical Exam  Vitals  /68   Pulse 67   Temp 36.7 °C (98 °F) (Temporal)   Resp 17   Ht 1.626 m (5' 4\")   Wt 65.8 kg (145 lb)   SpO2 93%   General: Well Developed, Well Nourished, no acute distress  HEENT: Normocephalic, atraumatic  Eyes: Anicteric, PERRLA, EOMI  Neck: Supple, nontender, no masses  Lungs: CTA, no wheezes or crackles  Heart: RRR, no murmurs, rubs or gallops  Abdomen: Soft, NT, ND  Pelvis: Stable to AP and Lateral Compression  Skin: Intact, no open wounds  Extremities: Skin intact.  Patient does have pain with any attempted logrolling of the leg.  She has no pain with flexion extension of the knee no pain or bony palpation of her knee.  She does have midline incision consistent with total knee replacement.  No pain palpation range of motion of the ankle.  Patient is able to fire EHL FHL gastrocsoleus complex and tibia.  Sensations intact superficial peroneal deep peroneal tibial nerves palpable DP pulse compartment soft and compressible capillary refill <2 seconds    Radiographs:  CT-HEAD W/O   Final Result      1.  No acute intracranial " findings.      2.  Diffuse atrophy and periventricular white matter change, consistent with chronic small vessel disease.         DX-PELVIS-1 OR 2 VIEWS   Final Result      Right intertrochanteric femoral neck fracture.      DX-CHEST-LIMITED (1 VIEW)   Final Result         Cardiomegaly with perihilar interstitial prominence suggesting mild edema.      DX-FEMUR-2+ RIGHT   Final Result      Comminuted, displaced intertrochanteric right femoral neck fracture.      CT-FOREIGN FILM CAT SCAN   Final Result      DX-PORTABLE FLUORO > 1 HOUR    (Results Pending)   DX-HIP-COMPLETE - UNILATERAL 2+ RIGHT    (Results Pending)       Laboratory Values  Recent Labs     10/23/20  1850   WBC 12.5*   RBC 5.41*   HEMOGLOBIN 16.2*   HEMATOCRIT 51.9*   MCV 95.9   MCH 29.9   MCHC 31.2*   RDW 53.7*   PLATELETCT 202   MPV 9.9     Recent Labs     10/23/20  1850   SODIUM 141   POTASSIUM 4.5   CHLORIDE 104   CO2 27   GLUCOSE 110*   BUN 10         Imaging:  Plain radiographs demonstrate an intertrochanteric hip fracture of the right hip.  This does appear to be a stable fracture pattern with intact lateral wall with no subtrochanteric extension.  There is a total knee replacement present.  No other fractures dislocations noted    Impression:  Orthopedic assessment:  1.  Right intertrochanteric hip fracture    Plan: Discussed multiple treatment options including conservative nonoperative modalities.  I outlined that this would result in a long course of bedrest including possible complications include pneumonia DVTs blood clots bedsores urinary tract infections and decubitus ulcers.  After going over risks and benefits of operative intervention including earlier weightbearing patient has elected to proceed with surgery.. Risks and benefits of surgery were discussed which include but are not limited to bleeding, infection, neurovascular damage, malunion, nonunion, instability, limb length discrepancy, DVT, PE, MI, Stroke and death. They  understand these risks and wish to proceed.

## 2020-10-24 NOTE — PROGRESS NOTES
"Pt up to chair for meal, calm, cooperative,pleasant affect. RN present for rounds by Ortho DR ORELLANA reviewed, which is for work w/ therapy,pain mgt PRN. RN encouraged CDB w/ \"I.S.\" will reinforce t/o day. Fall and safety precautions in place, pt uses call light appropriately. Pt has SCDs on for VTE. EDU done on \"ankl e pumps\" to promote circulation. Pt is independent w/ turns for skin integrity. Hourly rounds ongoing, call light a/in reach.  "

## 2020-10-24 NOTE — ANESTHESIA TIME REPORT
Anesthesia Start and Stop Event Times     Date Time Event    10/23/2020 2105 Ready for Procedure     2120 Anesthesia Start     2321 Anesthesia Stop        Responsible Staff  10/23/20    Name Role Begin End    Nereida Cai M.D. Anesth 2120 2241    Ian Faulkner M.D. Anesth 2241 2321        Preop Diagnosis (Free Text):  Pre-op Diagnosis     RIGHT INTERTROCHANTERIC HIP FRACTURE        Preop Diagnosis (Codes):    Post op Diagnosis  Hip fracture (HCC)      Premium Reason  A. 3PM - 7AM    Comments: emergency

## 2020-10-24 NOTE — ED NOTES
Pharmacy Medication Reconciliation      Medication reconciliation updated and complete per pt at bedside with medication list. Reviewed list with pt and returned at bedside  Allergies have been verified and updated   No oral ABX within the last 14 days  Patient home pharmacy:[facility]

## 2020-10-24 NOTE — PROGRESS NOTES
Patient has not voided since arrival to unit. Fluids encouraged. Resident drinks 480ml of cranberry juice. Will monitor.

## 2020-10-24 NOTE — OP REPORT
OPERATIVE NOTE     DATE OF PROCEDURE: 10/23/2020            PRE-OP DIAGNOSIS: Right intertrochanteric hip fracture            POST-OP DIAGNOSIS: same            PROCEDURE: Right cephalomedullary nailing hip fracture            SURGEON: Lester Peoples M.D. - Primary            ANESTHESIA: General            ESTIMATED BLOOD LOSS: 100 cc                   SPECIMENS: None            COMPLICATIONS: None            CONDITION: Stable            OPERATIVE INDICATIONS AND DESCRIPTION OF PROCEDURE:     Implants: Synthes cephalomedullary nail 125 degree angle 10 mm    This patient is a 85-year-old female who sustained a right intertrochanteric hip fracture.  I had a long conversation with her as well as her son over the phone discussing this diagnosis and prognosis were.  I gave her number different options including nonoperative treatment.  Risk and benefits of this include prolonged nonweightbearing in the bed which could lead to DVT pulmonary embolism decubitus ulcer urinary tract infections and pneumonia.  I also outlined a surgical course including cephalomedullary nailing which would allow for earlier ambulation.  I also outlined several risks associated with surgery including chronic pain nerve vessel tendon damage, blood loss requiring transfusion, chronic pain, infection, hematoma formation secondary to blood thinner use, skin healing problems, revision surgery, failure of implants, nonunion malunion, and cardiopulmonary risks including DVT pulmonary embolism arrhythmias that may be lead to death.  Despite these risks patient has elected to proceed with surgery.  This point informed consent was obtained and surgical alfredo was placed on the patient's right leg.  Informed consent she was also signed informed that the correct site was the right lower extremity.    This point patient was brought to the operating room and placed supine on a fracture table.  All bony prominences were padded very well to prevent  neuropraxia's.  This point general anesthesia was introduced.  Next a timeout was performed identifying the correct right lower extremity.  A surgical marking site was visible.  All parties in the room were in agreement that this was the correct site.  Imaging also confirmed that this was the correct site.  Antibiotics were confirmed and given by anesthesia.  Also SCDs were present on the nonoperative extremity    Next using rotation traction and an abduction maneuver the fracture was closed reduced.  Fluoroscopy include AP lateral demonstrated excellent reduction.  Once we felt that this reduction was achieved we then proceeded to cephalomedullary nailing    This point a 3 cm incision was placed a few centimeters proximal to the greater trochanter.  This was in line with the femur.  Next blunt dissection was carried down the tip of the trochanter.  Next a guidepin was placed on the medial very medial aspect of the greater trochanter in line with the femur.  Next this was overreamed and due to tight canal fit distally we did place a guidewire and reamed distally in the shaft up to 11 5.  And a short cephalomedullary nail was inserted with its guide.  Using fluoroscopy we inserted this and what we appeared would be a good position.  Fracture was maintained.  Next the lateral skin incision was used in line with the jig aiming arm for the cephalomedullary screw.  Blunt dissection was carried down the level of the bone.  Using AP and lateral films a guidewire was placed through the jig into the femoral head.  Taking care to not penetrate the femoral head.  Next an opening drill was used once we confirmed on fluoroscopy excellent placement low along the femoral neck and head.  We then measured tapped and placed our cephalomedullary screw.  Next we placed our distal interlock screw by marking the skin make a small skin incision spreading to bone and drilling through the trocar.  Fluoroscopy demonstrate excellent position  of all implants.  At this point the jig aiming arm was removed.  We then tightened down the locking mechanism for the cephalomedullary screw backed up slightly to allow control compression.    All wounds were copiously irrigated.  Wounds were closed in layered fashion in interrupted manner.  Staples were used on the skin and sterile dressings were placed.  Final x-rays demonstrated anatomic reduction    Postoperative plan:  Patient can be partial weightbearing 50% on her right hip.  She can begin physical therapy and ambulating tomorrow morning with a walker.  I will defer on DVT prophylaxis to medicine as she was on Xarelto for A. fib.  She can resume this when they see fit.  She can have dressing changes postop day 5.  I would like to see patient back in 14 days in office.

## 2020-10-24 NOTE — RESPIRATORY CARE
COPD EDUCATION by COPD CLINICAL EDUCATOR  10/24/2020 at 1:22 PM by Lara Deluca, RRT     COPD Education provided  Patient interviewed by COPD education team.  Patient unable to participate in full program.  Short intervention has been conducted.  A comprehensive packet including information about COPD, treatments, and smoking cessation given.    Action Plan Completed/Updated?completed    Pulmonary Function Testing (PFT)? [facility] not available in EMR    [facility]ak Inspiratory flow (PIF)  Patients resistance: 60 L/min    Does patient currently use inhalers/nebulizer at home? Anoro Ellipta; Albuterol    Additional medication/equipment recommendations none    Is all Respiratory DME in place? Oxygen, Inogen POC  nebulizer   If yes, has patient been educated on use of DME?   Cleaning; O2 safety    Have all necessary follow up appointments been scheduled?   PCP or D/C clinic [facility] PCP pt to schedule   Specialty  Will have Ortho F/U; [facility] per pt

## 2020-10-24 NOTE — H&P
Hospital Medicine History & Physical Note    Date of Service  10/23/2020    Primary Care Physician  Melba Amaya M.D.    Consultants  Orthopedic    Code Status  Full Code    Chief Complaint  Chief Complaint   Patient presents with   • Sent by MD     from [facility]   • Hip Pain       History of Presenting Illness  85 y.o. female with past medical history of Atrial fibrillation on Xarelto (last dose last night), status post pacemaker, COPD on home oxygen 2 L, hypertension, hypothyroidism, CHF, bilateral total knee arthroplasty who presented 10/23/2020 as a transfer from [facility] status post mechanical fall while at [employer], miscalculated height and landed on right hip.  Patient states the pain was sharp, 10 out of 10 intensity, localized to hip diagnostic pelvis x-ray showed comminuted, displaced intertrochanteric right femoral neck fracture.  No hip dislocation no acetabular fracture noted.     In the ED, patient examined bedside is alert awake oriented x3 saturating 90 to 94% on 4 L oxygen not in any visible distress.  States she has high pain tolerance currently right hip pain 3 out of 10.     ER physician discussed with orthopedics, consult note to follow      Review of Systems  ROS    Past Medical History   has a past medical history of Arrhythmia, Arthritis, Cancer (HCC) (2001), Hypertension, and Unspecified disorder of thyroid.    Surgical History   has a past surgical history that includes knee arthroplasty total (1998); lumpectomy (2001); carpal tunnel release (2009); tonsillectomy (as child); ganglion excision (1995); ganglion excision (1995); knee arthroplasty total (9/28/2010); and recovery (11/22/2010).     Family History  family history includes Cancer in an other family member; Heart Disease in an other family member; Hypertension in an other family member.     Social History   reports that she quit smoking about 22 years ago. Her smoking use included  cigarettes. She has a 20.00 pack-year smoking history. She has never used smokeless tobacco. She reports current alcohol use. She reports that she does not use drugs.    Allergies  Allergies   Allergen Reactions   • Other Drug Hives     Oral contrast   • Latex Rash     .   • Metoprolol Succinate Er Unspecified     Weakness     • Penicillins Rash     .   • Tape Rash     .       Medications  Prior to Admission Medications   Prescriptions Last Dose Informant Patient Reported? Taking?   Ascorbic Acid (VITAMIN C PO) 10/23/2020 at 0800 Patient Yes Yes   Sig: Take 1 Tab by mouth every morning.   B Complex Vitamins (VITAMIN B COMPLEX PO) 10/23/2020 at 0800 Patient Yes No   Sig: Take 1 Tab by mouth every morning.   Multiple Vitamin (MULTIVITAMIN PO) 10/23/2020 at 0800 Patient Yes No   Sig: Take 1 Tab by mouth every morning.   Multiple Vitamins-Minerals (ZINC PO) 10/23/2020 at 0800 Patient Yes Yes   Sig: Take 1 Tab by mouth every morning.   Omega-3 Fatty Acids (OMEGA 3 PO) 10/21/2020 at pm Patient Yes No   Sig: Take 1 Cap by mouth every 48 hours.   VITAMIN D PO 10/23/2020 at 0800 Patient Yes Yes   Sig: Take 1 Tab by mouth every morning.   VITAMIN K PO 10/23/2020 at 0800 Patient Yes Yes   Sig: Take 1 Tab by mouth every morning.   digoxin (LANOXIN) 0.125 MG TABS 10/22/2020 at pm Patient No No   Sig: Take 1 Tab by mouth every 48 hours as needed.   digoxin (LANOXIN) 0.25 MG TABS 10/21/2020 at pm Patient No No   Sig: Take 1 Tab by mouth every 48 hours as needed.   diltiazem CD (CARDIZEM CD) 240 MG CP24 10/22/2020 at pm Patient No No   Sig: Take 1 Cap by mouth 2 Times a Day.   Patient taking differently: Take 240 mg by mouth every evening.   furosemide (LASIX) 20 MG TABS 10/23/2020 at 0800 Patient No No   Sig: Take 1 Tab by mouth every day.   levothyroxine (LEVOXYL) 100 MCG TABS 10/23/2020 at 0800 Patient No No   Sig: Take 1 Tab by mouth every day.   lisinopril (PRINIVIL) 20 MG TABS 10/23/2020 at 0800 Patient No No   Sig: Take 1  Tab by mouth 2 Times a Day.   rivaroxaban (XARELTO) 20 MG Tab tablet 10/22/2020 at pm Patient Yes Yes   Sig: Take 20 mg by mouth with dinner.   umeclidinium-vilanterol (ANORO ELLIPTA) 62.5-25 MCG/INH AEROSOL POWDER, BREATH ACTIVATED inhaler 10/23/2020 at 0800 Patient Yes Yes   Sig: Inhale 1 Puff by mouth every day.      Facility-Administered Medications: None       Physical Exam       Physical Exam  Constitutional:       Appearance: Normal appearance.   HENT:      Head: Normocephalic and atraumatic.      Right Ear: Tympanic membrane normal.      Left Ear: Tympanic membrane normal.      Mouth/Throat:      Mouth: Mucous membranes are dry.   Eyes:      Extraocular Movements: Extraocular movements intact.      Pupils: Pupils are equal, round, and reactive to light.   Neck:      Musculoskeletal: Neck supple.   Cardiovascular:      Heart sounds: Normal heart sounds. No murmur. No friction rub.      Comments: Irregularly irregular   Bialteral Dorsalis pedis 2+ intact  Pulmonary:      Effort: Pulmonary effort is normal. No respiratory distress.      Breath sounds: Normal breath sounds. No stridor.   Abdominal:      General: Bowel sounds are normal. There is no distension.      Palpations: Abdomen is soft. There is no mass.      Tenderness: There is no abdominal tenderness.      Hernia: No hernia is present.   Musculoskeletal:         General: Tenderness (Right hip tenderness) and deformity (Right hip internally rotated ) present.   Skin:     General: Skin is warm and dry.   Neurological:      Mental Status: She is alert and oriented to person, place, and time.      Cranial Nerves: No cranial nerve deficit.      Sensory: No sensory deficit.   Psychiatric:         Mood and Affect: Mood normal.         Behavior: Behavior normal.         Laboratory:          No results for input(s): ALTSGPT, ASTSGOT, ALKPHOSPHAT, TBILIRUBIN, DBILIRUBIN, GAMMAGT, AMYLASE, LIPASE, ALB, PREALBUMIN, GLUCOSE in the last 72 hours.      No results for  input(s): NTPROBNP in the last 72 hours.      No results for input(s): TROPONINT in the last 72 hours.    Imaging:  DX-PELVIS-1 OR 2 VIEWS   Final Result      Right intertrochanteric femoral neck fracture.      DX-CHEST-LIMITED (1 VIEW)   Final Result         Cardiomegaly with perihilar interstitial prominence suggesting mild edema.      DX-FEMUR-2+ RIGHT   Final Result      Comminuted, displaced intertrochanteric right femoral neck fracture.      CT-FOREIGN FILM CAT SCAN   Final Result            Assessment/Plan:  I anticipate this patient will require at least two midnights for appropriate medical management, necessitating inpatient admission.    * Closed comminuted intertrochanteric fracture of right femur (HCC)  Assessment & Plan  Patient is status post mechanical fall from [employer].   Patient on Xarelto for atrial fibrillation last dose 10/22/2020  Hold Xarelto for now in anticipation for OR tomorrow. Patient counseled on risks/benefits of holding ac and risk for stroke   Will obtain CT head without contrast as patient hit her head and on anticoagulation  Orthopedic consulted follow official note as per ERP plan to operate in a.m.  Pain control as per ortho  Revised cardiac risk index  High risk surgery: No 0 points  History of ischemic heart disease: No 0 points  History of CHF: Yes 1 point   History of CVA: No 0 points   Pre operative treatment with insulin: No 0 points   Pre-operative creatinine > 2 mg/dL : No 1 points     Total = 1 point = Class II risk 6.0% (30 day risk of death, MI, or cardiac arrest     RCRI greater than equal to 1 and age > 65 will obtain Pro BNP and re evalute risk stratification.    A-fib (HCC)- (present on admission)  Assessment & Plan  Continue with Cardizem  mg daily with holding parameters   Patient takes alternate doses of digoxin daily : last night took 250 mcg   -Continue with 125 mcg tonight   Hold off on anticoagulation in anticipation of procedure in am      Hypothyroid- (present on admission)  Assessment & Plan  Continue with levothyroxine     VTE ppx: heparin subcutaneous if CT head negative.

## 2020-10-24 NOTE — ED NOTES
Pt transports to floor with ED tech. Pt aox4, skin pink warm and dry, airway patent, rr even and unlabored, nad noted at this time. No new complaints. Pms intact to all extremities. Transported on 3lpm oxygen

## 2020-10-24 NOTE — ASSESSMENT & PLAN NOTE
Rate controlled   Cardizem , lisinopril on hold for given episodes of hypotension,  Continue with digoxin   On xarelto

## 2020-10-24 NOTE — PROGRESS NOTES
[facility] TRIAGE OFFICER ER REPORT    Consult/Admission requested by: Dr Escobar    Chief complaint: Fall  Pertinent history/ER Course: This is a 85-year-old woman who was at [employer] earlier and after stepping off a platform, her foot turned causing her to fall.  She was found to have right intertrochanteric hip fracture.  Otherwise her vital signs were stable.  ERP is contacting orthopedics.  Hospitalist admission is now being requested.    Patient meets admission criterion: Yes..  Recommendations given or work up & consultations requested per triage officer: None  Consultants involved and pertinent input from consultants: Orthopedics    Admission status: Inpatient.   Admission order placed: Yes.   Floor requested: Orthopedics  Assigned hospitalist: Dr. Nieto

## 2020-10-24 NOTE — PROGRESS NOTES
Patient arrives to floor, during transfer to bed, pre-op calls unit stating that patient will be taken to surgery at this time. Transport is present on floor. Taking patient to surgery.

## 2020-10-24 NOTE — THERAPY
Physical Therapy   Initial Evaluation     Patient Name: Angie Webb  Age:  85 y.o., Sex:  female  Medical Record #: [id number]  Today's Date: 10/24/2020     Precautions: Fall Risk, Partial Weight Bearing Right Lower Extremity (See Comments for Percentage)(50% PWB s/p IM nail after IT fx)    Assessment  Pt presents to PT s/p IM nailing after fall with fracture. She is able to demonstrate short distance ambulation with FWW with min A. She was limited 2' to difficulty with compensatory strategies given 50% PWB RLE precautions initially though anticipate she will progress quickly in this regard. She was also notably limited 2' to c/o increasing lightheadedness and nausea with upright activity (and noted concerns with low Sp02 and likely abnormal hemodyamics given low BP prior). Will continue to visit, with details./recs below.    Plan    Recommend Physical Therapy 4 times per week until therapy goals are met for the following treatments:  Bed Mobility, Community Re-integration, Equipment, Gait Training, Manual Therapy, Neuro Re-Education / Balance, Self Care/Home Evaluation, Stair Training, Therapeutic Activities and Therapeutic Exercises    DC Equipment Recommendations: Unable to determine at this time    Discharge Recommendations: Recommend post-acute placement for additional physical therapy services prior to discharge home (currently would recommend placement, however pt reports strong desire to progress to dc home and will continue to visit to progress to dc home plan if able, though will be dependent on pt's progress and increasing OOB activity in general while here)        10/24/20 1157   Prior Living Situation   Prior Services None   Housing / Facility [address]   Steps Into Home 4   Steps In Home 0   Rail Left Rail (Steps in Home);Right Rail  (Steps in Home)  (one set has L rail; other set has R rail)   Bathroom Set up Walk In Shower;Shower Chair   Equipment Owned Front-Wheel Walker;Wheelchair;Tub /  Shower Seat;Oxygen;Other (Comments)  (unclear 02 setup at home)   Lives with - Patient's Self Care Capacity Alone and Able to Care For Self   Comments pt rpeorts dtr is in town from [place] and will be available to assist as needed with IADls, transport and light ADls;    Prior Level of Functional Mobility   Bed Mobility Independent   Transfer Status Independent   Ambulation Independent   Distance Ambulation (Feet)   (communiyt)   Assistive Devices Used None   Stairs Independent   Comments I with IADL and ADLs prior   History of Falls   History of Falls Yes   Date of Last Fall   (fall PTA)   Cognition    Cognition / Consciousness X   Speech/ Communication Hard of Hearing   Safety Awareness Impaired   Comments somewhat tangential and odd affect at times (this is likely baseline behavior); and noted concerns as pt reporrts hypoxia at home at times (reports down to 70s at times) and reports limite concern?   Passive ROM Lower Body   Passive ROM Lower Body X   Comments grimaces with RLE hip flexion and ER knee extension; otherwise grossly WFL   Active ROM Lower Body    Active ROM Lower Body  X   Comments as above; initially required AAROM for hip flexion and knee extension at RLE; able to perform AROM with effort with practice   Strength Lower Body   Lower Body Strength  X   Comments as above; initially 3-/5 at RLE hip and knee, at least 3/5 by end of visit    Sensation Lower Body   Lower Extremity Sensation   X   Comments .reports decreased sensation at distal RLE as compared with LLE   Neurological Concerns   Neurological Concerns No   Balance Assessment   Sitting Balance (Static) Fair +   Sitting Balance (Dynamic) Fair +   Standing Balance (Static) Fair -   Standing Balance (Dynamic) Fair -   Weight Shift Sitting Good   Weight Shift Standing Fair   Comments no kortney LOB during ambulation with FWW: cueing and practice for step to mechanics; needed less assist/cueing with practive within visit; need to see re: carryover  next visit; limited distances 2' to concerns with hemodyanmics/symptoms   Gait Analysis   Gait Level Of Assist Minimal Assist   Assistive Device Front Wheel Walker   Distance (Feet) 18   # of Times Distance was Traveled 1   Deviation Step To;Decreased Base Of Support;Decreased Heel Strike;Decreased Toe Off;Other (Comment)  (see balance)   # of Stairs Climbed 0   Weight Bearing Status PWB RLE 50%   Comments see balance   Bed Mobility    Comments found in chair; returned to chair post   Functional Mobility   Sit to Stand Minimal Assist   Bed, Chair, Wheelchair Transfer Minimal Assist   Transfer Method Stand Step   Mobility with FWW   Activity Tolerance   Comments during ambulation pt reports increasing lightheadedness and nausea and returned to sitting with noted Sp02 at high 70s/low 80s with replacement of NC and recovery of symptoms; BP at 105 systolic sitting though anticiapte was likely lower during ambulation as has been running low per chart/nsg   Short Term Goals    Short Term Goal # 1 Pt will be able to perform supine<>sit with HOB flat/no rails with Spv in 6tx to promote fnx progression to I    Short Term Goal # 2 Pt will be able to perform sit<>stand/transfers with FWW with SPv in 6tx to promote fnx progression to I    Short Term Goal # 3 Pt will be able to ambulate 150ft with FWW iwth SPv in 6tx to promote fnx progression to I    Short Term Goal # 4 Pt will be able to ambulate up/down 4 steps with R rail with min A to promote dc to home with family assist (possible sidestepping?)   Short Term Goal # 5 Pt will be able to I verbalize and direct family/caregiver for wc bump up/down 4 steps in 6tx to allow ease of entry to home at time of dc (dependent on ability to ambulate stairs prior to dc)   Education Group   Role of Physical Therapist Patient Response Patient;Acceptance;Explanation;Verbal Demonstration   Gait Training Patient Response Patient;Acceptance;Explanation;Demonstration;Teach Back;Verbal  Demonstration;Action Demonstration   Stair Training Patient Response Patient;Acceptance;Explanation;Verbal Demonstration   Use of Assistive Device Patient Response Patient;Acceptance;Explanation;Demonstration;Teach Back;Verbal Demonstration;Action Demonstration   Weight Bearing Status Patient Response Patient;Acceptance;Explanation;Demonstration;Teach Back;Verbal Demonstration;Action Demonstration   Additional Comments discussion and education re: concerns with dc planning, need for increased mobility/medical stability and ability to demonstrate at minimum room distance ambulation and trial of stairs priro to dc; possible use of wc bump with family assist to enter home as pt adament re: dc home

## 2020-10-24 NOTE — ANESTHESIA POSTPROCEDURE EVALUATION
Patient: Angie Webb    Procedure Summary     Date: 10/23/20 Room / Location: [place] [address] / SURGERY [facility]    Anesthesia Start: 2120 Anesthesia Stop: 2321    Procedure: FIXATION, FRACTURE, HIP, USING DYNAMIC HIP SCREW, WITH COMPRESSION - INTERTROCH FRACTURE (Right Hip) Diagnosis: (RIGHT INTERTROCHANTERIC HIP FRACTURE)    Surgeon: Lester Peoples M.D. Responsible Provider: Ian Faulkner M.D.    Anesthesia Type: general ASA Status: 3 - Emergent          Final Anesthesia Type: general  Last vitals  BP   Blood Pressure : 138/73    Temp   36.8 °C (98.3 °F)    Pulse   Pulse: 68   Resp   15    SpO2   95 %      Anesthesia Post Evaluation    Patient location during evaluation: PACU  Patient participation: complete - patient participated  Level of consciousness: awake and alert  Pain score: 1    Airway patency: patent  Anesthetic complications: no  Cardiovascular status: hemodynamically stable  Respiratory status: acceptable  Hydration status: euvolemic    PONV: none           Nurse Pain Score: 0 (NPRS)

## 2020-10-24 NOTE — ANESTHESIA PREPROCEDURE EVALUATION
85F s/p mechanical fall and intratrochanteric hip fracture here for hip screw.    Allergies   Allergen Reactions   • Other Drug Hives     Oral contrast   • Latex Rash     .   • Metoprolol Succinate Er Unspecified     Weakness     • Penicillins Rash     .   • Tape Rash     .     Relevant Problems   ANESTHESIA   (-) History of anesthesia complications      PULMONARY   (+) Chronic obstructive pulmonary disease (COPD) ([facility]) (on 2L home O2)      CARDIAC   (+) A-fib ([facility])   (+) CAD (coronary artery disease)      ENDO   (+) Hypothyroid      Other   (+) Mechanical complication of cardiac pacemaker     Past Medical History:   Diagnosis Date   • Arrhythmia     atrial fibrillation; Cardiologist: Dr. Waters   • Arthritis    • Cancer ([facility]) 2001    left breast   • Hypertension    • Unspecified disorder of thyroid     hypothyroid      No current facility-administered medications on file prior to encounter.      Current Outpatient Medications on File Prior to Encounter   Medication Sig Dispense Refill   • rivaroxaban (XARELTO) 20 MG Tab tablet Take 20 mg by mouth with dinner.     • Multiple Vitamins-Minerals (ZINC PO) Take 1 Tab by mouth every morning.     • VITAMIN K PO Take 1 Tab by mouth every morning.     • VITAMIN D PO Take 1 Tab by mouth every morning.     • Ascorbic Acid (VITAMIN C PO) Take 1 Tab by mouth every morning.     • umeclidinium-vilanterol (ANORO ELLIPTA) 62.5-25 MCG/INH AEROSOL POWDER, BREATH ACTIVATED inhaler Inhale 1 Puff by mouth every day.     • levothyroxine (LEVOXYL) 100 MCG TABS Take 1 Tab by mouth every day. 90 Tab 4   • digoxin (LANOXIN) 0.125 MG TABS Take 1 Tab by mouth every 48 hours as needed. (Patient taking differently: Take 125 mcg by mouth every 48 hours.) 90 Each 3   • digoxin (LANOXIN) 0.25 MG TABS Take 1 Tab by mouth every 48 hours as needed. (Patient taking differently: Take 250 mcg by mouth every 48 hours.) 90 Each 3   • furosemide (LASIX) 20 MG TABS Take 1 Tab by mouth every day. 90 Each 3    • lisinopril (PRINIVIL) 20 MG TABS Take 1 Tab by mouth 2 Times a Day. 180 Each 11   • diltiazem CD (CARDIZEM CD) 240 MG CP24 Take 1 Cap by mouth 2 Times a Day. (Patient taking differently: Take 240 mg by mouth every evening.) 180 Cap 3   • B Complex Vitamins (VITAMIN B COMPLEX PO) Take 1 Tab by mouth every morning.     • Omega-3 Fatty Acids (OMEGA 3 PO) Take 1 Cap by mouth every 48 hours.     • Multiple Vitamin (MULTIVITAMIN PO) Take 1 Tab by mouth every morning.        Past Surgical History:   Procedure Laterality Date   • RECOVERY  11/22/2010    Performed by BETTY, CATH-RECOVERY at SURGERY SAME DAY [facility] ORS   • KNEE ARTHROPLASTY TOTAL  9/28/2010    Performed by MASOUD CALHOUN at SURGERY [facility] ORS   • CARPAL TUNNEL RELEASE  2009    right   • LUMPECTOMY  2001    left breast   • KNEE ARTHROPLASTY TOTAL  1998    right   • GANGLION EXCISION  1995    right wrist   • GANGLION EXCISION  1995    left foot   • TONSILLECTOMY  as child      Physical Exam    Airway   Mallampati: III  TM distance: >3 FB  Neck ROM: full       Cardiovascular - normal exam     Dental - normal exam           Pulmonary - normal exam     Abdominal    Neurological - normal exam                 Anesthesia Plan    ASA 3- EMERGENT   ASA physical status 3 criteria: COPDASA physical status emergent criteria: displaced fracture with possible neurovascular compromise    Plan - general       Airway plan will be LMA  (Pt last ate 3 prunes at 7am, had black coffee at that time. No other food since last night.  )    Plan Factors:   Patient was not previously instructed to abstain from smoking on day of procedure.  Patient did not smoke on day of procedure.      Induction: intravenous    Postoperative Plan: Postoperative administration of opioids is intended.    Pertinent diagnostic labs and testing reviewed    Informed Consent:    Anesthetic plan and risks discussed with patient.    Use of blood products discussed with: patient whom consented  to blood products.

## 2020-10-24 NOTE — OR NURSING
Pt's daughter called back and updated.  Pt now states pain just starting but she says not ready to be medicated yet.

## 2020-10-24 NOTE — ED TRIAGE NOTES
Angie Webb  85 y.o.  Chief Complaint   Patient presents with   • Sent by MD     from [facility]   • Hip Pain     Pt transferred from [facility] with a diagnosis of a R hip fracture.  [facility], her foot turned and she fell.      No medications or interventions during transport.    Pt arrives awake, calm.  Pt rates her pain 4-5/10.  PIV to RAC.

## 2020-10-24 NOTE — PROGRESS NOTES
"Orthopaedic Progress Note  POD#1 s/p CMN R hip fracture    Patient seen and examined  Reports not much pain, no numbness tingling  No ramos catheter, voiding well   Denies fevers, chills, chest pain     Recent Labs     10/23/20  1850   WBC 12.5*   RBC 5.41*   HEMOGLOBIN 16.2*   HEMATOCRIT 51.9*   MCV 95.9   MCH 29.9   MCHC 31.2*   RDW 53.7*   PLATELETCT 202   MPV 9.9       Examination:   BP (!) 96/59   Pulse 91   Temp 36 °C (96.8 °F) (Temporal)   Resp 17   Ht 1.626 m (5' 4\")   Wt 65.8 kg (145 lb)   SpO2 97%     No acute distress  Breathing unlabored  Dressing clean dry and intact   Motor: fires ehl, tibialis anterior, gastrocnemius  Sensation intact to light touch sural, saphenous, superficial peroneal, deep peroneal, tibial distributions  Compartments soft/compressible  Toes warm and well perfused, cap refill <2      A/P: POD#1 s/p right hip CMN    DVT Prophylaxis- TEDS/SCDs, xaralto can resume per medicine  Weight Bearing Status-50% WB  PT/OT  Antibiotics: periop  Case Coordination  "

## 2020-10-24 NOTE — ASSESSMENT & PLAN NOTE
S/P  Right cephalomedullary nailing hip fracture.  Continue pain medication   evaluated by PT recommended rehab placement .

## 2020-10-24 NOTE — PROGRESS NOTES
Pt dtr at bedside, RN has addressed questions and concerns from pt and dtr regarding discharge plans. PT and OT also came in to pt bedside to talk about plans for therapy tomorrow and possible progress toward discharge.

## 2020-10-24 NOTE — PROGRESS NOTES
[facility] Medicine Daily Progress Note    Date of Service  10/24/2020    Chief Complaint  85 y.o. female admitted 10/23/2020 with intertrochanteric right femoral neck fracture    Hospital Course    82 female with past medical history of hypertension, A. fib on Xarelto, status post pacemaker, CHF, COPD on home oxygen 2 L referred from [facility] for displaced intertrochanteric right femoral neck fracture.  Patient reportedly sustained a mechanical fall resulting localized hip pain and found to have right femur fracture on x-ray on evaluation.  Admitted for right intertrochanteric hip fracture requiring intervention.Evaluated by orthopedics s.p Right cephalomedullary nailing hip fracture.    10/24/2020-evaluated by physical therapist recommended postacute placement for further physical therapy.        Interval Problem Update  Patient seen and examined at bedside.  Comfortably sitting in chair.  Reports of minimal pain.  Able to move both extremities.  Denies any new weakness/numbness.  Afebrile, no complaint of fever, cough, nausea/vomiting, chest pain, palpitation, shortness of breath.    Consultants/Specialty  Surgery Orthopedic    Code Status  Full Code    Disposition  Rehab versus home    Review of Systems  ROS   Review of Systems   Constitutional: Negative for chills and fever.   HENT: Negative for ear pain, hearing loss and tinnitus.    Eyes: Negative for blurred vision, double vision and photophobia.   Respiratory: Negative for cough, hemoptysis and sputum production.    Cardiovascular: Negative for chest pain, palpitations, orthopnea and claudication.   Gastrointestinal: Negative for abdominal pain, nausea and vomiting.   Genitourinary: Negative for dysuria, frequency, hematuria and urgency.   Musculoskeletal: Positive for falls.        Right Hip pain.   Skin: Negative for rash.   Neurological: Negative for dizziness, tingling, tremors, sensory change, speech change, focal weakness and headaches.    Psychiatric/Behavioral: Negative for depression and suicidal ideas  Physical Exam  Temp:  [36 °C (96.8 °F)-37.7 °C (99.8 °F)] 36.7 °C (98 °F)  Pulse:  [] 70  Resp:  [11-21] 16  BP: ()/(46-79) 107/54  SpO2:  [84 %-100 %] 100 %    Physical Exam   Appearance: Normal appearance.   HENT:      Head: Normocephalic and atraumatic.      Nose: Nose normal.   Eyes:      Pupils: Pupils are equal, round, and reactive to light.   Neck:      Musculoskeletal: Neck supple.   Cardiovascular:      Rate and Rhythm: Normal rate. Rhythm irregular.      Pulses: Normal pulses.      Heart sounds: Normal heart sounds.   Pulmonary:      Effort: Pulmonary effort is normal. No respiratory distress.      Breath sounds: Normal breath sounds. No stridor.   Abdominal:      General: Abdomen is flat. There is no distension.   Musculoskeletal:      Comments: Range of motion restricted in right hip due to pain.   Neurological:      Mental Status: She is alert.       Fluids    Intake/Output Summary (Last 24 hours) at 10/24/2020 1402  Last data filed at 10/24/2020 0800  Gross per 24 hour   Intake 800 ml   Output 450 ml   Net 350 ml       Laboratory  Recent Labs     10/23/20  1850 10/24/20  0847   WBC 12.5* 14.5*   RBC 5.41* 4.48   HEMOGLOBIN 16.2* 13.7   HEMATOCRIT 51.9* 43.9   MCV 95.9 98.0*   MCH 29.9 30.6   MCHC 31.2* 31.2*   RDW 53.7* 54.7*   PLATELETCT 202 197   MPV 9.9 9.9     Recent Labs     10/23/20  1850   SODIUM 141   POTASSIUM 4.5   CHLORIDE 104   CO2 27   GLUCOSE 110*   BUN 10   CREATININE 0.37*   CALCIUM 9.3     Labs reviewed    Worsening leukocytosis-likely stress-induced    EKG noted for A. Fib, heart rate 80, right axis deviation,   Chest x-ray-enlarged cardiac silhouette, perihilar interstitial prominence resting mild edema.                Imaging  DX-HIP-COMPLETE - UNILATERAL 2+ RIGHT   Final Result         1.  Intraoperative changes of right proximal femoral intramedullary lucian with dynamic hip screw in progress       CT-HEAD W/O   Final Result      1.  No acute intracranial findings.      2.  Diffuse atrophy and periventricular white matter change, consistent with chronic small vessel disease.         DX-PELVIS-1 OR 2 VIEWS   Final Result      Right intertrochanteric femoral neck fracture.      DX-CHEST-LIMITED (1 VIEW)   Final Result         Cardiomegaly with perihilar interstitial prominence suggesting mild edema.      DX-FEMUR-2+ RIGHT   Final Result      Comminuted, displaced intertrochanteric right femoral neck fracture.      CT-FOREIGN FILM CAT SCAN   Final Result      DX-PORTABLE FLUORO > 1 HOUR    (Results Pending)        Assessment/Plan  *  intertrochanteric fracture of right femur  s/p CMN R hip fracture  Assessment & Plan     S/P  Right cephalomedullary nailing hip fracture.  Continue pain medication   Evaluated by PT recommended postacute placement for further physical therapy    A-fib ([facility])- (present on admission)  Assessment & Plan  Rate controlled   Cardizem , lisinopril on hold for given episodes of hypotension,  Continue with digoxin   Resume xarelto    CHF (congestive heart failure) ([facility])  Assessment & Plan  On lasix 40 mg daily   Monitor I/O closely     COPD (chronic obstructive pulmonary disease) ([facility])  Assessment & Plan  Continue 2L nasal cannula   xopenex  Q 6 hrs   Incentive spirometry       Hypothyroid- (present on admission)  Assessment & Plan  Continue with levothyroxine        VTE prophylaxis: xarelto

## 2020-10-24 NOTE — ED PROVIDER NOTES
ED Provider Note    CHIEF COMPLAINT   Chief Complaint   Patient presents with   • Sent by MD     from [facility]   • Hip Pain       HPI   Angie Webb is a 85 y.o. female who presents to the ED with a right hip pain.  Patient was transferred to outside facility secondary to a fall with intertrochanteric right hip fracture.  The patient was on her Pilates reformer, stepped off, tripped, landed on her hip, sharp severe pain.  Slight numbness to the foot.  Did hit her head but does not have a headache, no nausea vomiting, she denies any chest pain, shortness of breath, abdominal pains, nausea vomiting.    REVIEW OF SYSTEMS   See HPI for further details. All other systems are negative.     PAST MEDICAL HISTORY   Past Medical History:   Diagnosis Date   • Arrhythmia     atrial fibrillation; Cardiologist: Dr. Waters   • Arthritis    • Cancer (HCC) 2001    left breast   • Hypertension    • Unspecified disorder of thyroid     hypothyroid       FAMILY HISTORY  Family History   Problem Relation Age of Onset   • Heart Disease Other    • Hypertension Other    • Cancer Other        SOCIAL HISTORY  Social History     Socioeconomic History   • Marital status:      Spouse name: Not on file   • Number of children: Not on file   • Years of education: Not on file   • Highest education level: Not on file   Occupational History   • Not on file   Social Needs   • Financial resource strain: Not on file   • Food insecurity     Worry: Not on file     Inability: Not on file   • Transportation needs     Medical: Not on file     Non-medical: Not on file   Tobacco Use   • Smoking status: Former Smoker     Packs/day: 1.00     Years: 20.00     Pack years: 20.00     Types: Cigarettes     Quit date: 1998     Years since quittin.8   • Smokeless tobacco: Never Used   Substance and Sexual Activity   • Alcohol use: Yes     Comment: 4 per week   • Drug use: No   • Sexual activity: Not on file   Lifestyle   •  Physical activity     Days per week: Not on file     Minutes per session: Not on file   • Stress: Not on file   Relationships   • Social connections     Talks on phone: Not on file     Gets together: Not on file     Attends Mandaen service: Not on file     Active member of club or organization: Not on file     Attends meetings of clubs or organizations: Not on file     Relationship status: Not on file   • Intimate partner violence     Fear of current or ex partner: Not on file     Emotionally abused: Not on file     Physically abused: Not on file     Forced sexual activity: Not on file   Other Topics Concern   • Not on file   Social History Narrative   • Not on file       SURGICAL HISTORY  Past Surgical History:   Procedure Laterality Date   • RECOVERY  11/22/2010    Performed by BETTY CATH-RECOVERY at SURGERY SAME DAY [facility] ORS   • KNEE ARTHROPLASTY TOTAL  9/28/2010    Performed by MASOUD CALHOUN at [facility]   • CARPAL TUNNEL RELEASE  2009    right   • LUMPECTOMY  2001    left breast   • KNEE ARTHROPLASTY TOTAL  1998    right   • GANGLION EXCISION  1995    right wrist   • GANGLION EXCISION  1995    left foot   • TONSILLECTOMY  as child       CURRENT MEDICATIONS   Home Medications     Reviewed by Rashaun Ohara (Pharmacy Tech) on 10/23/20 at 1754  Med List Status: Complete   Medication Last Dose Status   Ascorbic Acid (VITAMIN C PO) 10/23/2020 Active   B Complex Vitamins (VITAMIN B COMPLEX PO) 10/23/2020 Active   digoxin (LANOXIN) 0.125 MG TABS 10/22/2020 Active   digoxin (LANOXIN) 0.25 MG TABS 10/21/2020 Active   diltiazem CD (CARDIZEM CD) 240 MG CP24 10/22/2020 Active   furosemide (LASIX) 20 MG TABS 10/23/2020 Active   levothyroxine (LEVOXYL) 100 MCG TABS 10/23/2020 Active   lisinopril (PRINIVIL) 20 MG TABS 10/23/2020 Active   Multiple Vitamin (MULTIVITAMIN PO) 10/23/2020 Active   Multiple Vitamins-Minerals (ZINC PO) 10/23/2020 Active   Omega-3 Fatty Acids (OMEGA 3 PO) 10/21/2020  "Active   rivaroxaban (XARELTO) 20 MG Tab tablet 10/22/2020 Active   umeclidinium-vilanterol (ANORO ELLIPTA) 62.5-25 MCG/INH AEROSOL POWDER, BREATH ACTIVATED inhaler 10/23/2020 Active   VITAMIN D PO 10/23/2020 Active   VITAMIN K PO 10/23/2020 Active                ALLERGIES   Allergies   Allergen Reactions   • Other Drug Hives     Oral contrast   • Latex Rash     .   • Metoprolol Succinate Er Unspecified     Weakness     • Penicillins Rash     .   • Tape Rash     .       PHYSICAL EXAM  VITAL SIGNS: Ht 1.626 m (5' 4\")   Wt 65.8 kg (145 lb)   BMI 24.89 kg/m²   Constitutional: Well developed, Well nourished, mild distress, Non-toxic appearance.   HENT:  Atraumatic, Normocephalic, Oral pharynx with moist mucous membranes.   Eyes: EOMI, PERRL  Cardiovascular: Good Pulses, regular rate and rhythm  Thorax & Lungs: No respiratory distress, clear to auscultation bilaterally  Abdomen: Soft nontender   Skin: Warm, Dry, No erythema, No rash.   Musculoskeletal: Shortening and external rotation of the right leg, decreased active motion, 2+ pulse, normal sensation distally  Neurologic: Alert & oriented x 3,     RADIOLOGY/PROCEDURES  DX-PELVIS-1 OR 2 VIEWS   Final Result      Right intertrochanteric femoral neck fracture.      DX-CHEST-LIMITED (1 VIEW)   Final Result         Cardiomegaly with perihilar interstitial prominence suggesting mild edema.      DX-FEMUR-2+ RIGHT   Final Result      Comminuted, displaced intertrochanteric right femoral neck fracture.      CT-FOREIGN FILM CAT SCAN   Final Result        Results for orders placed or performed during the hospital encounter of 11/22/10   PROTHROMBIN TIME   Result Value Ref Range    PT 12.6 10.0 - 13.0 sec    INR 1.09 0.86 - 1.14   EKG   Result Value Ref Range    Report AFIB/FLUT AND V-PACED COMPLEXES [Remains]     Report       NO FURTHER RHYTHM ANALYSIS ATTEMPTED DUE TO PACED RHYTHM    Report       NONSPECIFIC REPOL ABNORMALITY, DIFFUSE LEADS [Now Present]    Report SIGNIFICANT " RATE INCREASE     Report SIGNIFICANT RHYTHM CHANGES         COURSE & MEDICAL DECISION MAKING  Pertinent Labs & Imaging studies reviewed. (See chart for details)  Status post fall transfer from outside facility secondary to an intertrochanteric hip fracture.  Discussed case with hospitalist for hospitalization, discussed the case with Dr. Peoples for consultation.  The patient will have surgery tomorrow.  The patient is on Xarelto.      FINAL IMPRESSION  1. Closed fracture of right hip, initial encounter ([facility])        Patient referred to primary care provider for blood pressure management     This dictation was created using voice recognition software. The accuracy of the dictation is limited to the abilities of the software. I expect there may be some errors of grammar and possibly content. The nursing notes were reviewed and certain aspects of this information were incorporated into this note.    Electronically signed by: Jose Luis Escobar M.D., 10/23/2020 5:52 PM

## 2020-10-24 NOTE — PROGRESS NOTES
· 2 RN skin check complete with Alice VASQUEZ.    · Devices in place SCDs.     · Skin assessed under devices Yes.    · Confirmed pressure ulcers found on NA.     · New potential pressure ulcers noted on NA.     · Wound consult placed and wound reported NA.     · The following interventions in place Turns and repositions self, pressure redistribution mattress (atmosair)

## 2020-10-24 NOTE — ANESTHESIA QCDR
2019 [facility] Clinical Data Registry (for Quality Improvement)     Postoperative nausea/vomiting risk protocol (Adult = 18 yrs and Pediatric 3-17 yrs)- (430 and 463)  General inhalation anesthetic (NOT TIVA) with PONV risk factors: Yes  Provision of anti-emetic therapy with at least 2 different classes of agents: Yes   Patient DID NOT receive anti-emetic therapy and reason is documented in Medical Record:  N/A    Multimodal Pain Management- (477)  Non-emergent surgery AND patient age >= 18: No  Use of Multimodal Pain Management, two or more drugs and/or interventions, NOT including systemic opioids:   Exception: Documented allergy to multiple classes of analgesics:     Smoking Abstinence (404)  Patient is current smoker (cigarette, pipe, e-cig, marijuanna): No  Elective Surgery:   Abstinence instructions provided prior to day of surgery:   Patient abstained from smoking on day of surgery:     Pre-Op Beta-Blocker in Isolated CABG (44)  Isolated CABG AND patient age >= 18: No  Beta-blocker admin within 24 hours of surgical incision:   Exception:of medical reason(s) for not administering beta blocker within 24 hours prior to surgical incision (e.g., not  indicated,other medical reason):     PACU assessment of acute postoperative pain prior to Anesthesia Care End- Applies to Patients Age = 18- (ABG7)  Initial PACU pain score is which of the following: < 7/10  Patient unable to report pain score: N/A    Post-anesthetic transfer of care checklist/protocol to PACU/ICU- (426 and 427)  Upon conclusion of case, patient transferred to which of the following locations: PACU/Non-ICU  Use of transfer checklist/protocol: Yes  Exclusion: Service Performed in Patient Hospital Room (and thus did not require transfer): N/A  Unplanned admission to ICU related to anesthesia service up through end of PACU care- (MD51)  Unplanned admission to ICU (not initially anticipated at anesthesia start time): No

## 2020-10-24 NOTE — ANESTHESIA PROCEDURE NOTES
Airway    Date/Time: 10/23/2020 9:32 PM  Performed by: Nereida Cai M.D.  Authorized by: Nereida Cai M.D.     Location:  OR  Urgency:  Elective  Indications for Airway Management:  Anesthesia      Spontaneous Ventilation: absent    Sedation Level:  Deep  Preoxygenated: Yes    Patient Position:  Sniffing  Mask Difficulty Assessment:  0 - not attempted  Final Airway Type:  Endotracheal airway  Final Endotracheal Airway:  ETT  Cuffed: Yes    Technique Used for Successful ETT Placement:  Video laryngoscopy  Devices/Methods Used in Placement:  Intubating stylet    Insertion Site:  Oral  Blade Type:  Glide  Laryngoscope Blade/Videolaryngoscope Blade Size:  3  ETT Size (mm):  6.5  Leak Pressue (cm H2O):  35  Measured from:  Teeth  ETT to Teeth (cm):  21  Placement Verified by: auscultation and capnometry    Cormack-Lehane Classification:  Grade IIa - partial view of glottis  Number of Attempts at Approach:  1  Number of Other Approaches Attempted:  1  Unsuccessful Airway(s) Attempted:  SGA   Started with #4 LMA but LMA did not seat well, unable to ventilate.  LMA removed, paralysis and additional propofol given, and pt intubated with glidescope

## 2020-10-25 LAB
ANION GAP SERPL CALC-SCNC: 5 MMOL/L (ref 7–16)
BUN SERPL-MCNC: 14 MG/DL (ref 8–22)
CALCIUM SERPL-MCNC: 8.8 MG/DL (ref 8.5–10.5)
CHLORIDE SERPL-SCNC: 96 MMOL/L (ref 96–112)
CO2 SERPL-SCNC: 29 MMOL/L (ref 20–33)
CREAT SERPL-MCNC: 0.39 MG/DL (ref 0.5–1.4)
ERYTHROCYTE [DISTWIDTH] IN BLOOD BY AUTOMATED COUNT: 54.5 FL (ref 35.9–50)
GLUCOSE SERPL-MCNC: 100 MG/DL (ref 65–99)
HCT VFR BLD AUTO: 41.8 % (ref 37–47)
HGB BLD-MCNC: 12.8 G/DL (ref 12–16)
MCH RBC QN AUTO: 30 PG (ref 27–33)
MCHC RBC AUTO-ENTMCNC: 30.6 G/DL (ref 33.6–35)
MCV RBC AUTO: 97.9 FL (ref 81.4–97.8)
PLATELET # BLD AUTO: 154 K/UL (ref 164–446)
PMV BLD AUTO: 10.2 FL (ref 9–12.9)
POTASSIUM SERPL-SCNC: 4.2 MMOL/L (ref 3.6–5.5)
RBC # BLD AUTO: 4.27 M/UL (ref 4.2–5.4)
SODIUM SERPL-SCNC: 130 MMOL/L (ref 135–145)
WBC # BLD AUTO: 11.4 K/UL (ref 4.8–10.8)

## 2020-10-25 PROCEDURE — A9270 NON-COVERED ITEM OR SERVICE: HCPCS | Performed by: STUDENT IN AN ORGANIZED HEALTH CARE EDUCATION/TRAINING PROGRAM

## 2020-10-25 PROCEDURE — 80048 BASIC METABOLIC PNL TOTAL CA: CPT

## 2020-10-25 PROCEDURE — 700102 HCHG RX REV CODE 250 W/ 637 OVERRIDE(OP): Performed by: STUDENT IN AN ORGANIZED HEALTH CARE EDUCATION/TRAINING PROGRAM

## 2020-10-25 PROCEDURE — 36415 COLL VENOUS BLD VENIPUNCTURE: CPT

## 2020-10-25 PROCEDURE — 97530 THERAPEUTIC ACTIVITIES: CPT

## 2020-10-25 PROCEDURE — 85027 COMPLETE CBC AUTOMATED: CPT

## 2020-10-25 PROCEDURE — 99232 SBSQ HOSP IP/OBS MODERATE 35: CPT | Performed by: STUDENT IN AN ORGANIZED HEALTH CARE EDUCATION/TRAINING PROGRAM

## 2020-10-25 PROCEDURE — 770006 HCHG ROOM/CARE - MED/SURG/GYN SEMI*

## 2020-10-25 RX ORDER — OXYCODONE HYDROCHLORIDE AND ACETAMINOPHEN 5; 325 MG/1; MG/1
1 TABLET ORAL EVERY 6 HOURS PRN
Status: DISCONTINUED | OUTPATIENT
Start: 2020-10-25 | End: 2020-10-27 | Stop reason: HOSPADM

## 2020-10-25 RX ADMIN — OXYCODONE HYDROCHLORIDE AND ACETAMINOPHEN 1 TABLET: 5; 325 TABLET ORAL at 21:45

## 2020-10-25 RX ADMIN — ACETAMINOPHEN 650 MG: 325 TABLET, FILM COATED ORAL at 23:19

## 2020-10-25 RX ADMIN — UMECLIDINIUM BROMIDE AND VILANTEROL TRIFENATATE 1 PUFF: 62.5; 25 POWDER RESPIRATORY (INHALATION) at 05:42

## 2020-10-25 RX ADMIN — ACETAMINOPHEN 650 MG: 325 TABLET, FILM COATED ORAL at 05:39

## 2020-10-25 RX ADMIN — ACETAMINOPHEN 650 MG: 325 TABLET, FILM COATED ORAL at 10:15

## 2020-10-25 RX ADMIN — RIVAROXABAN 20 MG: 20 TABLET, FILM COATED ORAL at 16:20

## 2020-10-25 RX ADMIN — DIGOXIN 250 MCG: 250 TABLET ORAL at 16:21

## 2020-10-25 RX ADMIN — ACETAMINOPHEN 650 MG: 325 TABLET, FILM COATED ORAL at 16:20

## 2020-10-25 RX ADMIN — LEVOTHYROXINE SODIUM 100 MCG: 25 TABLET ORAL at 05:38

## 2020-10-25 ASSESSMENT — COGNITIVE AND FUNCTIONAL STATUS - GENERAL
MOVING FROM LYING ON BACK TO SITTING ON SIDE OF FLAT BED: A LOT
MOBILITY SCORE: 14
SUGGESTED CMS G CODE MODIFIER MOBILITY: CL
TURNING FROM BACK TO SIDE WHILE IN FLAT BAD: A LOT
CLIMB 3 TO 5 STEPS WITH RAILING: A LOT
STANDING UP FROM CHAIR USING ARMS: A LITTLE
WALKING IN HOSPITAL ROOM: A LITTLE
MOVING TO AND FROM BED TO CHAIR: A LOT

## 2020-10-25 ASSESSMENT — GAIT ASSESSMENTS
GAIT LEVEL OF ASSIST: MINIMAL ASSIST
ASSISTIVE DEVICE: FRONT WHEEL WALKER
DEVIATION: ANTALGIC;STEP TO;DECREASED HEEL STRIKE;DECREASED TOE OFF;OTHER (COMMENT)
DISTANCE (FEET): 10

## 2020-10-25 ASSESSMENT — PAIN DESCRIPTION - PAIN TYPE
TYPE: ACUTE PAIN;SURGICAL PAIN
TYPE: SURGICAL PAIN
TYPE: SURGICAL PAIN;ACUTE PAIN

## 2020-10-25 ASSESSMENT — ENCOUNTER SYMPTOMS
FOCAL WEAKNESS: 0
CHILLS: 0
TINGLING: 0
SPEECH CHANGE: 0
PALPITATIONS: 0
FALLS: 1
BLURRED VISION: 0
SENSORY CHANGE: 0
PHOTOPHOBIA: 0
ABDOMINAL PAIN: 0
DEPRESSION: 0
FEVER: 0
DOUBLE VISION: 0
SPUTUM PRODUCTION: 0
COUGH: 0
DIZZINESS: 0
NAUSEA: 0
HEMOPTYSIS: 0
VOMITING: 0
TREMORS: 0
CLAUDICATION: 0
HEADACHES: 0
ORTHOPNEA: 0

## 2020-10-25 NOTE — PROGRESS NOTES
"Orthopaedic Progress Note  POD#2 s/p CMN R hip fracture    Patient seen and examined  Reports not much pain, no numbness tingling  No ramos catheter, voiding well   Denies fevers, chills, chest pain   Has been OOB w walker    Recent Labs     10/23/20  1850 10/24/20  0847 10/25/20  0609   WBC 12.5* 14.5* 11.4*   RBC 5.41* 4.48 4.27   HEMOGLOBIN 16.2* 13.7 12.8   HEMATOCRIT 51.9* 43.9 41.8   MCV 95.9 98.0* 97.9*   MCH 29.9 30.6 30.0   MCHC 31.2* 31.2* 30.6*   RDW 53.7* 54.7* 54.5*   PLATELETCT 202 197 154*   MPV 9.9 9.9 10.2       Examination:   /59   Pulse 76   Temp 36.6 °C (97.9 °F) (Temporal)   Resp 17   Ht 1.626 m (5' 4\")   Wt 61.7 kg (136 lb 0.4 oz)   SpO2 (!) 71%     No acute distress  Breathing unlabored  Dressing clean dry and intact   Motor: fires ehl, tibialis anterior, gastrocnemius  Sensation intact to light touch sural, saphenous, superficial peroneal, deep peroneal, tibial distributions  Compartments soft/compressible  Toes warm and well perfused, cap refill <2      A/P: POD#2 s/p right hip CMN    DVT Prophylaxis- TEDS/SCDs, xaralto can resume per medicine  Weight Bearing Status-50% WB  PT/OT  Antibiotics: periop completed  Case Coordination  "

## 2020-10-25 NOTE — THERAPY
Occupational Therapy   Initial Evaluation     Patient Name: Angie Webb  Age:  85 y.o., Sex:  female  Medical Record #: [id number]  Today's Date: 10/24/2020     Precautions  Precautions: Partial Weight Bearing Right Lower Extremity (See Comments for Percentage)  Comments: 50% RLE following nailing to right hip fracture     Assessment  Patient is 85 y.o. female with a diagnosis of right hip nailing following fall during Pilates class with fracture.  Now with PWB 50% precautions.  Currently requiring min assist for transfers and unable to ambulate this afternoon to bathroom, difficulty maintaining PWB with need for close supervision and repeated instruction. Min assit for grooming/hygiene, toileting, moderate assist for LE dressing and LE care due to pain and inability to reach her feet.  Motivated for return home independently.  Generalized UE weakness limiting PWB mobility today.  Should be able to return home but may need short stay post acute therapy setting.      Plan     10/24/20 1810   Prior Living Situation   Prior Services Home-Independent;Housekeeping / Homemaker Services   Housing / Facility 1 Story House   Steps Into Home 4   Steps In Home 0   Bathroom Set up Walk In Shower;Shower Chair   Equipment Owned Front-Wheel Walker;Wheelchair;Oxygen  (using O2 at night only prior to admit )   Lives with - Patient's Self Care Capacity Alone and Able to Care For Self   Comments Daughter is here from out  of town and able to stay as needed to assist and live in; currently not working as a     Prior Level of ADL Function   Self Feeding Independent   Grooming / Hygiene Independent   Bathing Independent   Dressing Independent   Toileting Independent   Prior Level of IADL Function   Medication Management Independent   Laundry Independent   Kitchen Mobility Independent   Finances Independent   Home Management Requires Assist   Shopping Independent   Prior Level Of Mobility Independent Without Device  in Community   Driving / Transportation Driving Independent   Occupation (Pre-Hospital Vocational) Retired Due To Age   Comments prior to fall and admit had housekeeping services but otherwise, independent; supportive involved family    ADL Assessment   Eating Independent   Grooming Modified Independent   Upper Body Dressing Minimal Assist   Lower Body Dressing Moderate Assist   Toileting Minimal Assist   Patient / Family Goals   Patient / Family Goal #1 Home when able; rehab if needed    Short Term Goals   Short Term Goal # 1 Supervised functional transfer with AE as needed to /from toilet and chair    Short Term Goal # 2 Supervised for UE and LE dressing with AE prn    Short Term Goal # 3 Supervised standing grooming/hygiene at sink maintaining PWB precautions    Short Term Goal # 4 Patient to demonstrate safe light IADL's at FWW level        Recommend Occupational Therapy 3 times per week until therapy goals are met for the following treatments:  Adaptive Equipment, Self Care/Activities of Daily Living, Therapeutic Activities and Therapeutic Exercises.    Discharge recommendations:  Recommend post-acute placement for additional occupational therapy services prior to discharge home if she does not improve significantly over the next day or so.  May be able to d/c home with in home family support and home health therapy.

## 2020-10-25 NOTE — CARE PLAN
Problem: Communication  Goal: The ability to communicate needs accurately and effectively will improve  Outcome: PROGRESSING AS EXPECTED    Bed locked and in lowest  Problem: Safety  Goal: Will remain free from injury  Outcome: PROGRESSING AS EXPECTED

## 2020-10-25 NOTE — PROGRESS NOTES
[facility] Medicine Daily Progress Note    Date of Service  10/25/2020    Chief Complaint  85 y.o. female admitted 10/23/2020 with intertrochanteric right femoral neck fracture     Hospital Course    82 female with past medical history of hypertension, A. fib on Xarelto, status post pacemaker, CHF, COPD on home oxygen 2 L referred from [facility] for displaced intertrochanteric right femoral neck fracture.  Patient reportedly sustained a mechanical fall resulting localized hip pain and found to have right femur fracture on x-ray on evaluation.  Admitted for right intertrochanteric hip fracture requiring intervention.Evaluated by orthopedics s.p Right cephalomedullary nailing hip fracture.     10/24/2020-evaluated by physical therapist recommended postacute placement for further physical therapy.   10/25/2020-  Leucocytosis improved. Need rehab placement for additional physical therapy .     Interval Problem Update  Patient seen and examined at bedside.  Comfortably sitting in chair.  Reports of minimal pain.  Able to move both extremities.  Denies any new weakness/numbness.  Afebrile, no complaint of fever, cough, nausea/vomiting, chest pain, palpitation, shortness of breath.    Pt like to go home and doesn't wish to go to Rehab . She lives with her daughter .  Advised she need additional therapy before she can go home.     Consultants/Specialty  Surgery Orthopedic     Code Status  Full Code     Disposition  Rehab versus home    Review of Systems  Review of Systems   Constitutional: Negative for chills and fever.   HENT: Negative for ear pain, hearing loss and tinnitus.    Eyes: Negative for blurred vision, double vision and photophobia.   Respiratory: Negative for cough, hemoptysis and sputum production.    Cardiovascular: Negative for chest pain, palpitations, orthopnea and claudication.   Gastrointestinal: Negative for abdominal pain, nausea and vomiting.   Genitourinary: Negative for dysuria,  frequency, hematuria and urgency.   Musculoskeletal: Positive for falls.        Right Hip pain.   Skin: Negative for rash.   Neurological: Negative for dizziness, tingling, tremors, sensory change, speech change, focal weakness and headaches.   Psychiatric/Behavioral: Negative for depression and suicidal ideas.        Physical Exam  Temp:  [35.9 °C (96.7 °F)-36.7 °C (98 °F)] 36.6 °C (97.9 °F)  Pulse:  [68-90] 76  Resp:  [16-17] 17  BP: ()/(54-77) 116/59  SpO2:  [71 %-100 %] 71 %    Physical Exam  Constitutional:       Appearance: Normal appearance.   HENT:      Head: Normocephalic and atraumatic.      Nose: Nose normal.   Eyes:      Pupils: Pupils are equal, round, and reactive to light.   Neck:      Musculoskeletal: Neck supple.   Cardiovascular:      Rate and Rhythm: Normal rate. Rhythm irregular.      Pulses: Normal pulses.      Heart sounds: Normal heart sounds.   Pulmonary:      Effort: Pulmonary effort is normal. No respiratory distress.      Breath sounds: Normal breath sounds. No stridor.   Abdominal:      General: Abdomen is flat. There is no distension.   Musculoskeletal:      Comments: Range of motion restricted in right hip due to pain.   Neurological:      Mental Status: She is alert.         Fluids    Intake/Output Summary (Last 24 hours) at 10/25/2020 1057  Last data filed at 10/25/2020 0900  Gross per 24 hour   Intake 240 ml   Output --   Net 240 ml       Laboratory  Recent Labs     10/23/20  1850 10/24/20  0847 10/25/20  0609   WBC 12.5* 14.5* 11.4*   RBC 5.41* 4.48 4.27   HEMOGLOBIN 16.2* 13.7 12.8   HEMATOCRIT 51.9* 43.9 41.8   MCV 95.9 98.0* 97.9*   MCH 29.9 30.6 30.0   MCHC 31.2* 31.2* 30.6*   RDW 53.7* 54.7* 54.5*   PLATELETCT 202 197 154*   MPV 9.9 9.9 10.2     Recent Labs     10/23/20  1850 10/25/20  0609   SODIUM 141 130*   POTASSIUM 4.5 4.2   CHLORIDE 104 96   CO2 27 29   GLUCOSE 110* 100*   BUN 10 14   CREATININE 0.37* 0.39*   CALCIUM 9.3 8.8            Labs reviewed              Imaging  DX-HIP-COMPLETE - UNILATERAL 2+ RIGHT   Final Result         1.  Intraoperative changes of right proximal femoral intramedullary lucian with dynamic hip screw in progress      CT-HEAD W/O   Final Result      1.  No acute intracranial findings.      2.  Diffuse atrophy and periventricular white matter change, consistent with chronic small vessel disease.         DX-PELVIS-1 OR 2 VIEWS   Final Result      Right intertrochanteric femoral neck fracture.      DX-CHEST-LIMITED (1 VIEW)   Final Result         Cardiomegaly with perihilar interstitial prominence suggesting mild edema.      DX-FEMUR-2+ RIGHT   Final Result      Comminuted, displaced intertrochanteric right femoral neck fracture.      CT-FOREIGN FILM CAT SCAN   Final Result      DX-PORTABLE FLUORO > 1 HOUR    (Results Pending)        Assessment/Plan  *  intertrochanteric fracture of right femur  s/p CMN R hip fracture  Assessment & Plan     S/P  Right cephalomedullary nailing hip fracture.  Continue pain medication   Evaluated by PT recommended postacute placement for further physical therapy    A-fib ([facility])- (present on admission)  Assessment & Plan  Rate controlled   Cardizem , lisinopril on hold for given episodes of hypotension,  Continue with digoxin   Resume xarelto    CHF (congestive heart failure) ([facility])  Assessment & Plan  On lasix 40 mg daily   Monitor I/O closely     COPD (chronic obstructive pulmonary disease) ([facility])  Assessment & Plan  Continue 2L nasal cannula   xopenex  Q 6 hrs   Incentive spirometry       Hypothyroid- (present on admission)  Assessment & Plan  Continue with levothyroxine        VTE prophylaxis: on xarelto

## 2020-10-25 NOTE — THERAPY
"Physical Therapy   Daily Treatment     Patient Name: Angie Webb  Age:  85 y.o., Sex:  female  Medical Record #: [id number]  Today's Date: 10/25/2020     Precautions: Partial Weight Bearing Right Lower Extremity (See Comments for Percentage), Fall Risk(50%WB RLE s/p IMN after IT fx)    Assessment    Patient progressing with functional mobility but continues to be limited by pain and decreased activity tolerance. She ambulated approximately 10ft x2 with FWW and min A; she required repetitive cueing for step to technique (\"walker, right foot, left foot\") and reported lightheadedness with activity. See vitals below. Provided education regarding use of supplemental O2/maintaining SpO2 > 90%, weight bearing precautions, use of AD, and gait mechanics; patient verbalized understanding but would likely benefit from reinforcement. Will continue to follow.    Plan    Continue current treatment plan.    DC Equipment Recommendations: Front-Wheel Walker, Unable to determine at this time  Discharge Recommendations: Recommend post-acute placement for additional physical therapy services prior to discharge home. Patient verbalized wish to return home but not currently at safe level to do so.      Subjective    \"I got up to use the commode already this morning but haven't walked very far.\"     Objective       10/25/20 0929   Vitals   Pulse Oximetry (!) 71 %   O2 (LPM) 3   O2 Delivery Device Silicone Nasal Cannula   Vitals Comments patient reported no O2 during day at baseline, SpO2 to low 70s on room air with activity and reported lightheadedness, replaced NC and provided education regarding importance of maintaining SpO2 > 90%. BP 80/46 immediately following ambulation while sitting in chair; improved to 98/50 with 5 min seated rest; patient reported this is low for her   Pain 0 - 10 Group   Location Hip   Location Orientation Right   Therapist Pain Assessment During Activity;Post Activity Pain Same as Prior to " "Activity;Nurse Notified   Cognition    Cognition / Consciousness X   Speech/ Communication Hard of Hearing   Level of Consciousness Alert   Comments pleasant, cooperative, required repetitive cues for \"walker, right foot, left foot\"   Balance   Sitting Balance (Static) Fair +   Sitting Balance (Dynamic) Fair +   Standing Balance (Static) Fair   Standing Balance (Dynamic) Fair -   Comments with FWW, no LOB   Gait Analysis   Gait Level Of Assist Minimal Assist   Assistive Device Front Wheel Walker   Distance (Feet) 10   # of Times Distance was Traveled 2   Deviation Antalgic;Step To;Decreased Heel Strike;Decreased Toe Off;Other (Comment)  (decreased bigg, step length)   # of Stairs Climbed 0   Bed Mobility    Supine to Sit   (NT, in chair pre and post)   Scooting Supervised  (seated)   Functional Mobility   Sit to Stand Minimal Assist   Bed, Chair, Wheelchair Transfer Minimal Assist   Toilet Transfers Minimal Assist  (BSC)   Transfer Method Stand Step   Patient / Family Goals    Patient / Family Goal #1 to return to home   Goal #1 Outcome Goal not met   Short Term Goals    Short Term Goal # 1 Pt will be able to perform supine<>sit with HOB flat/no rails with Spv in 6tx to promote fnx progression to I    Goal Outcome # 1 Other (see comments)  (not observed this session)   Short Term Goal # 2 Pt will be able to perform sit<>stand/transfers with FWW with SPv in 6tx to promote fnx progression to I    Goal Outcome # 2 Goal not met   Short Term Goal # 3 Pt will be able to ambulate 150ft with FWW iwth SPv in 6tx to promote fnx progression to I    Goal Outcome # 3 Goal not met   Short Term Goal # 4 Pt will be able to ambulate up/down 4 steps with R rail with min A to promote dc to home with family assist (possible sidestepping?)   Goal Outcome # 4 Goal not met   Short Term Goal # 5 Pt will be able to I verbalize and direct family/caregiver for wc bump up/down 4 steps in 6tx to allow ease of entry to home at time of dc " (dependent on ability to ambulate stairs prior to dc)   Goal Outcome # 5 Goal not met

## 2020-10-26 PROBLEM — I50.9 CHF (CONGESTIVE HEART FAILURE) (HCC): Chronic | Status: ACTIVE | Noted: 2020-10-24

## 2020-10-26 PROCEDURE — A9270 NON-COVERED ITEM OR SERVICE: HCPCS | Performed by: STUDENT IN AN ORGANIZED HEALTH CARE EDUCATION/TRAINING PROGRAM

## 2020-10-26 PROCEDURE — 770006 HCHG ROOM/CARE - MED/SURG/GYN SEMI*

## 2020-10-26 PROCEDURE — 99232 SBSQ HOSP IP/OBS MODERATE 35: CPT | Performed by: STUDENT IN AN ORGANIZED HEALTH CARE EDUCATION/TRAINING PROGRAM

## 2020-10-26 PROCEDURE — 700102 HCHG RX REV CODE 250 W/ 637 OVERRIDE(OP): Performed by: STUDENT IN AN ORGANIZED HEALTH CARE EDUCATION/TRAINING PROGRAM

## 2020-10-26 RX ADMIN — RIVAROXABAN 20 MG: 20 TABLET, FILM COATED ORAL at 17:33

## 2020-10-26 RX ADMIN — ACETAMINOPHEN 650 MG: 325 TABLET, FILM COATED ORAL at 14:07

## 2020-10-26 RX ADMIN — OXYCODONE HYDROCHLORIDE AND ACETAMINOPHEN 1 TABLET: 5; 325 TABLET ORAL at 04:20

## 2020-10-26 RX ADMIN — DIGOXIN 125 MCG: 125 TABLET ORAL at 17:34

## 2020-10-26 RX ADMIN — UMECLIDINIUM BROMIDE AND VILANTEROL TRIFENATATE 1 PUFF: 62.5; 25 POWDER RESPIRATORY (INHALATION) at 04:21

## 2020-10-26 RX ADMIN — LEVOTHYROXINE SODIUM 100 MCG: 25 TABLET ORAL at 04:19

## 2020-10-26 RX ADMIN — ACETAMINOPHEN 650 MG: 325 TABLET, FILM COATED ORAL at 08:49

## 2020-10-26 RX ADMIN — OXYCODONE HYDROCHLORIDE AND ACETAMINOPHEN 1 TABLET: 5; 325 TABLET ORAL at 20:20

## 2020-10-26 ASSESSMENT — PAIN DESCRIPTION - PAIN TYPE
TYPE: ACUTE PAIN
TYPE: SURGICAL PAIN
TYPE: SURGICAL PAIN
TYPE: SURGICAL PAIN;ACUTE PAIN

## 2020-10-26 NOTE — PROGRESS NOTES
[facility] Medicine Daily Progress Note    Date of Service  10/26/2020    Chief Complaint  85 y.o. female admitted 10/23/2020 with intertrochanteric right femoral neck fracture     Hospital Course    82 female with past medical history of hypertension, A. fib on Xarelto, status post pacemaker, CHF, COPD on home oxygen 2 L referred from [facility] for displaced intertrochanteric right femoral neck fracture.  Patient reportedly sustained a mechanical fall resulting localized hip pain and found to have right femur fracture on x-ray on evaluation.  Admitted for right intertrochanteric hip fracture requiring intervention.Evaluated by orthopedics s.p Right cephalomedullary nailing hip fracture.     10/24/2020-evaluated by physical therapist recommended postacute placement for further physical therapy.   10/25/2020-  Leucocytosis improved. Need rehab placement for additional physical therapy .  10/26/2020- Pt requesting to go to [facility] rehab      Interval Problem Update  Patient seen and examined at bedside.  Comfortably sitting in chair.  pain well controlled.  Able to move both extremities.  Denies any new weakness/numbness.  Afebrile, no complaint of fever, cough, nausea/vomiting, chest pain, palpitation, shortness of breath.    Consultants/Specialty  Surgery Orthopedic     Code Status  Full Code     Disposition  Rehab versus home     Review of Systems  Review of Systems   Constitutional: Negative for chills and fever.   HENT: Negative for ear pain, hearing loss and tinnitus.    Eyes: Negative for blurred vision, double vision and photophobia.   Respiratory: Negative for cough, hemoptysis and sputum production.    Cardiovascular: Negative for chest pain, palpitations, orthopnea and claudication.   Gastrointestinal: Negative for abdominal pain, nausea and vomiting.   Genitourinary: Negative for dysuria, frequency, hematuria and urgency.   Musculoskeletal: Positive for falls.        Right Hip pain.    Skin: Negative for rash.   Neurological: Negative for dizziness, tingling, tremors, sensory change, speech change, focal weakness and headaches.   Psychiatric/Behavioral: Negative for depression and suicidal ideas.          Physical Exam  Temp:  [35.9 °C (96.6 °F)-36.6 °C (97.9 °F)] 36.6 °C (97.9 °F)  Pulse:  [61-82] 61  Resp:  [16] 16  BP: (104-116)/(55-69) 106/55  SpO2:  [95 %-99 %] 95 %    Physical Exam  Physical Exam  Constitutional:       Appearance: Normal appearance.   HENT:      Head: Normocephalic and atraumatic.      Nose: Nose normal.   Eyes:      Pupils: Pupils are equal, round, and reactive to light.   Neck:      Musculoskeletal: Neck supple.   Cardiovascular:      Rate and Rhythm: Normal rate. Rhythm irregular.      Pulses: Normal pulses.      Heart sounds: Normal heart sounds.   Pulmonary:      Effort: Pulmonary effort is normal. No respiratory distress.      Breath sounds: Normal breath sounds. No stridor.   Abdominal:      General: Abdomen is flat. There is no distension.   Musculoskeletal:      Comments: Range of motion restricted in right hip due to pain.   Neurological:      Mental Status: She is alert.       Fluids    Intake/Output Summary (Last 24 hours) at 10/26/2020 1011  Last data filed at 10/26/2020 0909  Gross per 24 hour   Intake 940 ml   Output --   Net 940 ml       Laboratory  Recent Labs     10/23/20  1850 10/24/20  0847 10/25/20  0609   WBC 12.5* 14.5* 11.4*   RBC 5.41* 4.48 4.27   HEMOGLOBIN 16.2* 13.7 12.8   HEMATOCRIT 51.9* 43.9 41.8   MCV 95.9 98.0* 97.9*   MCH 29.9 30.6 30.0   MCHC 31.2* 31.2* 30.6*   RDW 53.7* 54.7* 54.5*   PLATELETCT 202 197 154*   MPV 9.9 9.9 10.2     Recent Labs     10/23/20  1850 10/25/20  0609   SODIUM 141 130*   POTASSIUM 4.5 4.2   CHLORIDE 104 96   CO2 27 29   GLUCOSE 110* 100*   BUN 10 14   CREATININE 0.37* 0.39*   CALCIUM 9.3 8.8                   Imaging  DX-PORTABLE FLUORO > 1 HOUR   Final Result      Portable fluoroscopy utilized for 1.6 seconds.          INTERPRETING LOCATION: [address] [place] [address], [place], [address]      DX-HIP-COMPLETE - UNILATERAL 2+ RIGHT   Final Result         1.  Intraoperative changes of right proximal femoral intramedullary lucian with dynamic hip screw in progress      CT-HEAD W/O   Final Result      1.  No acute intracranial findings.      2.  Diffuse atrophy and periventricular white matter change, consistent with chronic small vessel disease.         DX-PELVIS-1 OR 2 VIEWS   Final Result      Right intertrochanteric femoral neck fracture.      DX-CHEST-LIMITED (1 VIEW)   Final Result         Cardiomegaly with perihilar interstitial prominence suggesting mild edema.      DX-FEMUR-2+ RIGHT   Final Result      Comminuted, displaced intertrochanteric right femoral neck fracture.      CT-FOREIGN FILM CAT SCAN   Final Result           Assessment/Plan  *  intertrochanteric fracture of right femur  s/p CMN R hip fracture  Assessment & Plan     S/P  Right cephalomedullary nailing hip fracture.  Continue pain medication   evaluated by PT recommended rehab placement .    A-fib ([facility])- (present on admission)  Assessment & Plan  Rate controlled   Cardizem , lisinopril on hold for given episodes of hypotension,  Continue with digoxin   On xarelto    CHF (congestive heart failure) ([facility])  Assessment & Plan  On lasix 40 mg daily   Monitor I/O closely     COPD (chronic obstructive pulmonary disease) ([facility])  Assessment & Plan  Continue 2L nasal cannula   xopenex  Q 6 hrs   Incentive spirometry       Hypothyroid- (present on admission)  Assessment & Plan  Continue with levothyroxine        VTE prophylaxis: on Xarelto

## 2020-10-26 NOTE — DISCHARGE PLANNING
Received Choice form at 6824  Agency/Facility Name: [facility]  Referral sent per Choice form @ [id number]

## 2020-10-26 NOTE — DISCHARGE PLANNING
Anticipated Discharge Disposition: SNF    Action: Spoke to pt at bedside who verified all information on facesheet. Pt states that she uses home O2 only at night. Pt states she has all DME from when her late  needed it, wheelchair, walker, cane, shower chair. Pt states she lives alone in a single story home in [place], [place]. Pt drives self. Pt has a PCP whom she saw 3 weeks ago.    Barriers to Discharge: SNF acceptance    Plan: f/u with medical team, pt, [facility]

## 2020-10-26 NOTE — CARE PLAN
Problem: Communication  Goal: The ability to communicate needs accurately and effectively will improve  Outcome: PROGRESSING AS EXPECTED   Patient uses call light to communicate with staff and verbalize needs. Bed locked and in lowest position. Treaded socks in place. Hourly rounding on going.   Problem: Venous Thromboembolism (VTW)/Deep Vein Thrombosis (DVT) Prevention:  Goal: Patient will participate in Venous Thrombosis (VTE)/Deep Vein Thrombosis (DVT)Prevention Measures  Outcome: PROGRESSING AS EXPECTED   Scds in place.

## 2020-10-26 NOTE — DOCUMENTATION QUERY
[facility]                                                                       Query Response Note      PATIENT:               JEMAL GRAYSON  ACCT #:                  [id number]  MRN:                     [id number]  :                      1935  ADMIT DATE:       10/23/2020 4:56 PM  DISCH DATE:          RESPONDING  PROVIDER #:        [id number]           QUERY TEXT:    COPD on home oxygen 2L is documented in the Medical Record. Can a diagnosis be provided to  further support the use of home oxygen?     NOTE:  If an appropriate response is not listed below, please respond with a new note.      The patient's Clinical Indicators include:  COPD  6L Mask - 3L NC   COPD Education  Anoro Ellipta INH Daily  Xopenex Neb  Incentive spirometry  Options provided:   -- Chronic Respiratory Failure   -- Acute on Chronic Respiratory Failure   -- Chronic Hypoxemia   -- Unable to determine      Query created by: Rita Santo on 10/26/2020 12:20 PM    RESPONSE TEXT:    Chronic Respiratory Failure          Electronically signed by:  EITAN MAHMOOD MD 10/26/2020 12:41 PM

## 2020-10-26 NOTE — FACE TO FACE
Face to Face Note  -  Durable Medical Equipment    Wyatt Sanchez M.D. - NPI: [id number]  I certify that this patient is under my care and that they have had a durable medical equipment(DME)face to face encounter by the clinical nurse specialist working collaboratively with me that meets the physician DME face-to-face encounter requirements with this patient on:    Date of encounter:   Patient:                    MRN:                       YOB: 2019  Angie Webb  [id number]  1935     The encounter with the patient was in whole, or in part, for the following medical condition, which is the primary reason for durable medical equipment:  Other - right hip fracture    I certify that, based on my findings, the following durable medical equipment is medically necessary:  Front-Wheel Walker          ,     ,         My Clinical findings support the need for the above equipment due to:  Abnormal Gait    Supporting Symptoms:     ------------------------------------------------------------------------------------------------------------------    Face to Face Supporting Documentation - Home Health    The encounter with this patient was in whole or in part the primary reason for home health admission.    Date of encounter:   Patient:                    MRN:                       YOB: 2019  Angie Webb  [id number]  1935     Home health to see patient for:  Physical Therapy evaluation and treatment    Skilled need for:  Comment: physical therapy        Homebound evidenced status by:  Need the aid of supportive devices such as crutches, canes, wheelchairs or walkers. Leaving home must require a considerable and taxing effort. There must exist a normal inability to leave the home.    Community Physician to provide follow up care: TITA Barnes     Optional Interventions    Wound information & treatment:    Home Infusion Therapy orders:     Line/Drain/Airway:    I certify the face to face encounter for this home care referral meets the CMS requirements and the encounter/clinical assessment with the patient was, in whole, or in part, for the medical condition(s) listed above, which is the primary reason for home health care. Based on my clinical findings: the service(s) are medically necessary, support the need for home health care, and the homebound criteria are met.  I certify that this patient has had a face to face encounter by the clinical nurse specialist working collaboratively with me.  Wyatt Sanchez M.D. - NPI: [id number]    *Debility, frailty and advanced age in the absence of an acute deterioration or exacerbation of a condition do not qualify a patient for home health.

## 2020-10-26 NOTE — PROGRESS NOTES
Cardiovascular Nurse Navigator () [facility] Failure Program Inpatient Progress Note:    HF is on patient's problems list and in MD notes. Acute vs Chronic is not noted.     Respectfully request that the distinction be clearly made as inpatient nursing interventions and outpatient preparation are different for the two.     This will also help to conserve 7 day heart failure follow up appointments for patients who have had HF exacerbations.    It would appear that patient is here being treated for right femoral neck fracture. Patient is admitted to [facility]- and has only PO furosemide ordered, no IV diuresis.     I have messaged Dr. Sanchez requesting clarification.    The last contact that patient has with cardiology in our system, was in 2012, and it did not mention heart failure as a diagnosis. We do not have an echocardiogram in our system.    Thank you, Amara Cardio RN Navigator [phone]    ADDENDUM 10.27.20 1115:    Per my discussion with Dr. Aleman today, patient's HF is not acutely decompensated. Dr. Aleman states that he will put in his dc summary that the hF is not decompensated.

## 2020-10-27 VITALS
WEIGHT: 136.02 LBS | BODY MASS INDEX: 23.22 KG/M2 | RESPIRATION RATE: 17 BRPM | SYSTOLIC BLOOD PRESSURE: 120 MMHG | HEIGHT: 64 IN | DIASTOLIC BLOOD PRESSURE: 51 MMHG | TEMPERATURE: 99 F | OXYGEN SATURATION: 94 % | HEART RATE: 74 BPM

## 2020-10-27 PROCEDURE — 94640 AIRWAY INHALATION TREATMENT: CPT

## 2020-10-27 PROCEDURE — 700102 HCHG RX REV CODE 250 W/ 637 OVERRIDE(OP): Performed by: STUDENT IN AN ORGANIZED HEALTH CARE EDUCATION/TRAINING PROGRAM

## 2020-10-27 PROCEDURE — A9270 NON-COVERED ITEM OR SERVICE: HCPCS | Performed by: STUDENT IN AN ORGANIZED HEALTH CARE EDUCATION/TRAINING PROGRAM

## 2020-10-27 PROCEDURE — 99239 HOSP IP/OBS DSCHRG MGMT >30: CPT | Performed by: STUDENT IN AN ORGANIZED HEALTH CARE EDUCATION/TRAINING PROGRAM

## 2020-10-27 PROCEDURE — 97116 GAIT TRAINING THERAPY: CPT | Mod: CQ

## 2020-10-27 PROCEDURE — 97530 THERAPEUTIC ACTIVITIES: CPT | Mod: CQ

## 2020-10-27 RX ORDER — OXYCODONE HYDROCHLORIDE AND ACETAMINOPHEN 5; 325 MG/1; MG/1
1 TABLET ORAL EVERY 8 HOURS PRN
Qty: 15 TAB | Refills: 0 | Status: SHIPPED | OUTPATIENT
Start: 2020-10-27 | End: 2020-10-30

## 2020-10-27 RX ADMIN — OXYCODONE HYDROCHLORIDE AND ACETAMINOPHEN 1 TABLET: 5; 325 TABLET ORAL at 02:47

## 2020-10-27 RX ADMIN — OXYCODONE HYDROCHLORIDE AND ACETAMINOPHEN 1 TABLET: 5; 325 TABLET ORAL at 09:41

## 2020-10-27 RX ADMIN — LEVOTHYROXINE SODIUM 100 MCG: 25 TABLET ORAL at 06:06

## 2020-10-27 RX ADMIN — UMECLIDINIUM BROMIDE AND VILANTEROL TRIFENATATE 1 PUFF: 62.5; 25 POWDER RESPIRATORY (INHALATION) at 07:43

## 2020-10-27 ASSESSMENT — GAIT ASSESSMENTS
ASSISTIVE DEVICE: FRONT WHEEL WALKER
DISTANCE (FEET): 20
GAIT LEVEL OF ASSIST: MINIMAL ASSIST
DEVIATION: ANTALGIC;STEP TO;BRADYKINETIC

## 2020-10-27 ASSESSMENT — COGNITIVE AND FUNCTIONAL STATUS - GENERAL
WALKING IN HOSPITAL ROOM: A LITTLE
MOBILITY SCORE: 15
STANDING UP FROM CHAIR USING ARMS: A LITTLE
MOVING FROM LYING ON BACK TO SITTING ON SIDE OF FLAT BED: A LOT
TURNING FROM BACK TO SIDE WHILE IN FLAT BAD: A LITTLE
MOVING TO AND FROM BED TO CHAIR: A LOT
SUGGESTED CMS G CODE MODIFIER MOBILITY: CK
CLIMB 3 TO 5 STEPS WITH RAILING: A LOT

## 2020-10-27 ASSESSMENT — PAIN DESCRIPTION - PAIN TYPE: TYPE: ACUTE PAIN

## 2020-10-27 NOTE — PROGRESS NOTES
Report received by dayshift RN. Assumed care of pt. Assessment complete. Pt A&Ox4, VSS and on 2L O2. Pt was medicated for R hip pain rated 7/10 with PRN percocet. Pt is a x1 assist with a FWW and calls appropriately. Pt is currently resting in bed. All needs met at this time. Plan of care discussed. Call light within reach, bed in lowest position, and pt has no further questions at this time.

## 2020-10-27 NOTE — THERAPY
"Physical Therapy   Daily Treatment     Patient Name: Angie Webb  Age:  85 y.o., Sex:  female  Medical Record #: [id number]  Today's Date: 10/27/2020     Precautions: Fall Risk, Partial Weight Bearing Right Lower Extremity (See Comments for Percentage)(50% WB R LE)    Assessment    Pt continues to progress well w/ therapy. Pt still needing assist for R LE for bed mobility. She was able to increase her tolerance to mobility while adhering to WB restrictions. Pt having a harder time maintaining WB restrictions when navigating obstacles. Pt having a harder time standing from lower surfaces. She continues to be at a level in which she will benefit from post acute therapy for optimal rehab progress.    Plan    Continue current treatment plan.    DC Equipment Recommendations: Unable to determine at this time  Discharge Recommendations: Recommend post-acute placement for additional physical therapy services prior to discharge home      Subjective    \"It may look better but it doesn't feel better.\"     Objective       10/27/20 0841   Precautions   Precautions Fall Risk;Partial Weight Bearing Right Lower Extremity (See Comments for Percentage)  (50% WB R LE)   Gait Analysis   Gait Level Of Assist Minimal Assist   Assistive Device Front Wheel Walker   Distance (Feet) 20   # of Times Distance was Traveled 2   Deviation Antalgic;Step To;Bradykinetic   Comments Pt w/ good adherence to WB status during gait. Fatigue more from heavy UE reliance.   Bed Mobility    Supine to Sit Minimal Assist   Sit to Supine   (NT up EOB)   Scooting Supervised   Rolling   (sit pivot)   Comments Pt needing assist w/ R LE   Functional Mobility   Sit to Stand Minimal Assist   Bed, Chair, Wheelchair Transfer Minimal Assist   Transfer Method Other (Comments)  (Ambulating)   Mobility With FWW. Pt needing more assist from lower surface to get to stand.   Short Term Goals    Short Term Goal # 1 Pt will be able to perform supine<>sit with HOB flat/no " rails with Spv in 6tx to promote fnx progression to I    Goal Outcome # 1 goal not met   Short Term Goal # 2 Pt will be able to perform sit<>stand/transfers with FWW with SPv in 6tx to promote fnx progression to I    Goal Outcome # 2 Goal not met   Short Term Goal # 3 Pt will be able to ambulate 150ft with FWW iwth SPv in 6tx to promote fnx progression to I    Goal Outcome # 3 Goal not met   Short Term Goal # 4 Pt will be able to ambulate up/down 4 steps with R rail with min A to promote dc to home with family assist (possible sidestepping?)   Goal Outcome # 4 Goal not met   Short Term Goal # 5 Pt will be able to I verbalize and direct family/caregiver for wc bump up/down 4 steps in 6tx to allow ease of entry to home at time of dc (dependent on ability to ambulate stairs prior to dc)   Goal Outcome # 5 Goal not met

## 2020-10-27 NOTE — PROGRESS NOTES
Patient alert and oriented. Discussed discharge instructions with patient. Answered any questions patient had. [facility] Paperwork packet together with prescription for Percocet in packet. AVS signed along with narcotic consent. Copies of AVS with patient and facility packet. Removed PIV. PIV intact. Patient with 2 belongings bags and all belongings together. Patient transported to [facility] with escort in wheelchair.

## 2020-10-27 NOTE — DISCHARGE SUMMARY
Discharge Summary    CHIEF COMPLAINT ON ADMISSION  Chief Complaint   Patient presents with   • Sent by MD     from [facility]   • Hip Pain       Reason for Admission  Transfer From [facility]     Admission Date  10/23/2020    CODE STATUS  Full Code    HPI & HOSPITAL COURSE  This is a 85 y.o. female here with medical history of Atrial fibrillation on Xarelto , status post pacemaker, COPD on home oxygen 2 L, hypertension, hypothyroidism, CHF, bilateral total knee arthroplasty who presented 10/23/2020 as a transfer from [facility] after a mechanical fall. Diagnostic pelvis x-ray showed comminuted, displaced intertrochanteric right femoral neck fracture.  No hip dislocation no acetabular fracture noted.   Orthopedic surgery consulted & underwent Right cephalomedullary nailing hip fracture with Dr Lester Peoples M.D. on 10/23/2020.    During the hospital stay,  patient LORENA Quinns  is rate controlled with home Digoxin, Xarelto restarted after the surgery.   Continue home oral Lasix 20 mg daily for medical history of CHF, pt is not in acute exacerbation of CHF, patient is euvolemic and does not need IV diuretics, and also patient's oxygen requirement is stable at baseline at 2 L.  Advised the patient to stop taking home lisinopril until she was reevaluated by her primary care physician, since the patient was having episodes of hypotension during the hospital stay.     Physical therapy evaluated the patient and recommended to discharge the pt to skilled nursing facility.  Pain control with oxycodone as needed x  3 days.     Therefore, she is discharged in fair and stable condition to skilled nursing facility.    The patient met 2-midnight criteria for an inpatient stay at the time of discharge.    Discharge Date  10/27/2020    FOLLOW UP ITEMS POST DISCHARGE  Follow-up with PCP in 1-2 weeks after discharge.    DISCHARGE DIAGNOSES  Principal Problem:     intertrochanteric fracture of right  femur  s/p CMN R hip fracture POA: Unknown  Active Problems:    A-fib ([facility]) POA: Yes    COPD (chronic obstructive pulmonary disease) ([facility]) POA: Unknown    CHF (congestive heart failure) ([facility]) (Chronic) POA: Unknown      Overview: On lasix 40 mg daily       Monitor I/O closely     Hypothyroid POA: Yes  Resolved Problems:    * No resolved hospital problems. *      FOLLOW UP  No future appointments.  [address] [id number] [phone]          MEDICATIONS ON DISCHARGE     Medication List      START taking these medications      Instructions   oxyCODONE-acetaminophen 5-325 MG Tabs  Commonly known as: PERCOCET   Take 1 Tab by mouth every 8 hours as needed for Severe Pain for up to 3 days.  Dose: 1 Tab        CHANGE how you take these medications      Instructions   * digoxin 125 MCG Tabs  What changed: when to take this  Commonly known as: LANOXIN   Take 1 Tab by mouth every 48 hours as needed.  Dose: 125 mcg     * digoxin 250 MCG Tabs  What changed: when to take this  Commonly known as: LANOXIN   Take 1 Tab by mouth every 48 hours as needed.  Dose: 250 mcg     DILTIAZem  MG Cp24  What changed: when to take this  Commonly known as: CARDIZEM CD   Take 1 Cap by mouth 2 Times a Day.  Dose: 240 mg         * This list has 2 medication(s) that are the same as other medications prescribed for you. Read the directions carefully, and ask your doctor or other care provider to review them with you.            CONTINUE taking these medications      Instructions   Anoro Ellipta 62.5-25 MCG/INH Aepb inhaler  Generic drug: umeclidinium-vilanterol   Inhale 1 Puff by mouth every day.  Dose: 1 Puff     furosemide 20 MG Tabs  Commonly known as: LASIX   Take 1 Tab by mouth every day.  Dose: 20 mg     levothyroxine 100 MCG Tabs  Commonly known as: Levoxyl   Take 1 Tab by mouth every day.  Dose: 100 mcg     MULTIVITAMIN PO   Take 1 Tab by mouth every morning.  Dose: 1 Tab     OMEGA 3  PO   Take 1 Cap by mouth every 48 hours.  Dose: 1 Cap     VITAMIN B COMPLEX PO   Take 1 Tab by mouth every morning.  Dose: 1 Tab     VITAMIN C PO   Take 1 Tab by mouth every morning.  Dose: 1 Tab     VITAMIN D PO   Take 1 Tab by mouth every morning.  Dose: 1 Tab     VITAMIN K PO   Take 1 Tab by mouth every morning.  Dose: 1 Tab     Xarelto 20 MG Tabs tablet  Generic drug: rivaroxaban   Take 20 mg by mouth with dinner.  Dose: 20 mg     ZINC PO   Take 1 Tab by mouth every morning.  Dose: 1 Tab        STOP taking these medications    lisinopril 20 MG Tabs  Commonly known as: PRINIVIL            Allergies  Allergies   Allergen Reactions   • Other Drug Hives     Oral contrast   • Latex Rash     .   • Metoprolol Succinate Er Unspecified     Weakness     • Penicillins Rash     .   • Tape Rash     .       DIET  Orders Placed This Encounter   Procedures   • Diet Order Regular     Standing Status:   Standing     Number of Occurrences:   1     Order Specific Question:   Diet:     Answer:   Regular [1]       ACTIVITY  As tolerated.  Weight Bearing Status-50% WB    CONSULTATIONS  Orthopedic surgery    PROCEDURES  Right cephalomedullary nailing hip fracture with Dr Lester Peoples M.D. on 10/23/2020.      DX-PORTABLE FLUORO > 1 HOUR   Final Result       Portable fluoroscopy utilized for 1.6 seconds.           INTERPRETING LOCATION: [address] [place] [address], [address]       DX-HIP-COMPLETE - UNILATERAL 2+ RIGHT   Final Result           1.  Intraoperative changes of right proximal femoral intramedullary lucian with dynamic hip screw in progress       CT-HEAD W/O   Final Result       1.  No acute intracranial findings.       2.  Diffuse atrophy and periventricular white matter change, consistent with chronic small vessel disease.           DX-PELVIS-1 OR 2 VIEWS   Final Result       Right intertrochanteric femoral neck fracture.       DX-CHEST-LIMITED (1 VIEW)   Final Result           Cardiomegaly with perihilar interstitial prominence  suggesting mild edema.       DX-FEMUR-2+ RIGHT   Final Result       Comminuted, displaced intertrochanteric right femoral neck fracture.           LABORATORY  Lab Results   Component Value Date    SODIUM 130 (L) 10/25/2020    POTASSIUM 4.2 10/25/2020    CHLORIDE 96 10/25/2020    CO2 29 10/25/2020    GLUCOSE 100 (H) 10/25/2020    BUN 14 10/25/2020    CREATININE 0.39 (L) 10/25/2020    CREATININE 0.7 12/12/2008        Lab Results   Component Value Date    WBC 11.4 (H) 10/25/2020    HEMOGLOBIN 12.8 10/25/2020    HEMATOCRIT 41.8 10/25/2020    PLATELETCT 154 (L) 10/25/2020        Total time of the discharge process exceeds 46 minutes.

## 2020-10-27 NOTE — DISCHARGE INSTRUCTIONS
Discharge Instructions    Discharged to other by medical transportation with escort. Discharged via wheelchair, hospital escort: Yes.  Special equipment needed: Walker    Be sure to schedule a follow-up appointment with your primary care doctor or any specialists as instructed.     Discharge Plan:   Diet Plan: Discussed  Activity Level: Discussed  Confirmed Follow up Appointment: Appointment Scheduled  Confirmed Symptoms Management: Discussed  Medication Reconciliation Updated: Yes  Influenza Vaccine Indication: Not indicated: Previously immunized this influenza season and > 8 years of age  Influenza Vaccine Given - only chart on this line when given: Influenza Vaccine Given (See MAR)    I understand that a diet low in cholesterol, fat, and sodium is recommended for good health. Unless I have been given specific instructions below for another diet, I accept this instruction as my diet prescription.   Other diet: Regular    Special Instructions: None    · Is patient discharged on Warfarin / Coumadin?   No     Depression / Suicide Risk    As you are discharged from this [facility] facility, it is important to learn how to keep safe from harming yourself.    Recognize the warning signs:  · Abrupt changes in personality, positive or negative- including increase in energy   · Giving away possessions  · Change in eating patterns- significant weight changes-  positive or negative  · Change in sleeping patterns- unable to sleep or sleeping all the time   · Unwillingness or inability to communicate  · Depression  · Unusual sadness, discouragement and loneliness  · Talk of wanting to die  · Neglect of personal appearance   · Rebelliousness- reckless behavior  · Withdrawal from people/activities they love  · Confusion- inability to concentrate     If you or a loved one observes any of these behaviors or has concerns about self-harm, here's what you can do:  · Talk about it- your feelings and reasons for harming  yourself  · Remove any means that you might use to hurt yourself (examples: pills, rope, extension cords, firearm)  · Get professional help from the community (Mental Health, Substance Abuse, psychological counseling)  · Do not be alone:Call your Safe Contact- someone whom you trust who will be there for you.  · Call your local CRISIS HOTLINE 891-9502 or 466-638-3275  · Call your local Children's Mobile Crisis Response Team [place] (652) 705-2602 or [employer]  · Call the toll free National Suicide Prevention Hotlines   · National Suicide Prevention Lifeline 496-903-YGTL (4748)  · [employer] 800-SUICIDE (678-3112)        Hip Fracture    A hip fracture is a break in the upper part of the thigh bone (femur). This is usually the result of an injury, commonly a fall.  What are the causes?  This condition may be caused by:  · A direct hit or injury (trauma) to the side of the hip, such as from a fall or a car accident.  What increases the risk?  You are more likely to develop this condition if:  · You have poor balance or an unsteady walking pattern (gait). Certain conditions contribute to poor balance, including Parkinson disease and dementia.  · You have thinning or weakening of your bones, such as from osteopenia or osteoporosis.  · You have cancer that spreads to the leg bones.  · You have certain conditions that can weaken your bones, such as thyroid disorders, intestine disorders, or a lack (deficiency) of certain nutrients.  · You smoke.  · You take certain medicines, such as steroids.  · You have a history of broken bones.  What are the signs or symptoms?  Symptoms of this condition include:  · Pain over the injured hip. This is commonly felt on the side of the hip or in the front groin area.  · Stiffness, bruising, and swelling over the hip.  · Pain with movement of the leg, especially lifting it up. Pain often gets better with rest.  · Difficulty or inability to stand, walk, or  use the leg to support body weight (put weight on the leg).  · The leg rolling outward when lying down.  · The affected leg being shorter than the other leg.  How is this diagnosed?  This condition may be diagnosed based on:  · Your symptoms.  · A physical exam.  · X-rays. These may be done:  ? To confirm the diagnosis.  ? To determine the type and location of the fracture.  ? To check for other injuries.  · MRI or CT scans. These may be done if the fracture is not visible on an X-ray.  How is this treated?  Treatment for this condition depends on the severity and location of your fracture. In most cases, surgery is necessary. Surgery may involve:  · Repairing the fracture with a screw, nail, or lucian to hold the bone in place (open reduction and internal fixation, ORIF).  · Replacing the damaged parts of the femur with metal implants (hemiarthroplasty or arthroplasty).  If your fracture is less severe, or if you are not eligible for surgery, you may have non-surgical treatment. Non-surgical treatment may involve:  · Using crutches, a walker, or a wheelchair until your health care provider says that you can support (bear) weight on your hip.  · Medicines to help reduce pain and swelling.  · Having regular X-rays to monitor your fracture and make sure that it is healing.  · Physical therapy. You may need physical therapy after surgery, too.  Follow these instructions at home:  Activity  · Do not use your injured leg to support your body weight until your health care provider says that you can.  ? Follow standing and walking restrictions as told by your health care provider.  ? Use crutches, a walker, or a wheelchair as directed.  · Avoid any activities that cause pain or irritation in your hip. Ask your health care provider what activities are safe for you.  · Do not drive or use heavy machinery until your health care provider approves.  · If physical therapy was prescribed, do exercises as told by your health care  provider.  General instructions  · Take over-the-counter and prescription medicines only as told by your health care provider.  · If directed, put ice on the injured area:  ? Put ice in a plastic bag.  ? Place a towel between your skin and the bag.  ? Leave the ice on for 20 minutes, 2-3 times a day.  · Do not use any products that contain nicotine or tobacco, such as cigarettes and e-cigarettes. These can delay bone healing. If you need help quitting, ask your health care provider.  · Keep all follow-up visits as told by your health care provider. This is important.  How is this prevented?  · To prevent falls at home:  ? Use a cane, walker, or wheelchair as directed.  ? Make sure your rooms and hallways are free of clutter, obstacles, and cords.  ? Install grab bars in your bedroom and bathrooms.  ? Always use handrails when going up and down stairs.  ? Use nightlights around the house.  · Exercise regularly. Ask what forms of exercise are safe for you, such as walking and strength and balance exercises.  · Visit an eye doctor regularly to have your eyesight checked. This can help prevent falls.  · Make sure you get enough calcium and vitamin D.  · Do not use any products that contain nicotine or tobacco, such as cigarettes and e-cigarettes. If you need help quitting, ask your health care provider.  · Limit alcohol use.  · If you have an underlying condition that caused your hip fracture, work with your health care provider to manage your condition.  Contact a health care provider if:  · Your pain gets worse or it does not get better with rest or medicine.  · You develop any of the following in your leg or foot:  ? Numbness.  ? Tingling.  ? A change in skin color (discoloration).  ? Skin feeling cold to the touch.  Get help right away if:  · Your pain suddenly gets worse.  · You cannot move your hip.  Summary  · A hip fracture is a break in the upper part of the thigh bone (femur).  · Treatment typically require  surgical management to restore stability and function to the hip.  · Pain medicine and icing of the affected leg can help manage pain and swelling. Follow directions as told by your health care provider.  This information is not intended to replace advice given to you by your health care provider. Make sure you discuss any questions you have with your health care provider.  Document Released: 12/18/2006 Document Revised: 09/07/2019 Document Reviewed: 01/20/2018  [employer] Patient Education © 2020 [employer]      Intramedullary Nailing of Hip Fracture, Care After  This sheet gives you information about how to care for yourself after your procedure. Your health care provider may also give you more specific instructions. If you have problems or questions, contact your health care provider.  What can I expect after the surgery?  After the procedure, it is common to have these symptoms in your hip area:  · Pain.  · Swelling.  · Tenderness.  · Stiffness and weakness.  Follow these instructions at home:  Medicines  · Take over-the-counter and prescription medicines only as told by your health care provider.  · Ask your health care provider if the medicine prescribed to you:  ? Requires you to avoid driving or using heavy machinery.  ? Can cause constipation. You may need to take actions to prevent or treat constipation, such as:  § Drink enough fluid to keep your urine pale yellow.  § Take over-the-counter or prescription medicines.  § Eat foods that are high in fiber, such as beans, whole grains, and fresh fruits and vegetables.  § Limit foods that are high in fat and processed sugars, such as fried or sweet foods.  Bathing  · Do not take baths, swim, or use a hot tub until your health care provider approves. Ask your health care provider if you may take showers. You may only be allowed to take sponge baths.  · Keep your bandage (dressing) dry if you shower or take a sponge bath.  Incision care    · Follow instructions  from your health care provider about how to take care of your incision. Make sure you:  ? Wash your hands with soap and water before and after you change your dressing. If soap and water are not available, use hand .  ? Change your dressing as told by your health care provider.  ? Leave stitches (sutures), skin glue, or adhesive strips in place. These skin closures may need to stay in place for 2 weeks or longer. If adhesive strip edges start to loosen and curl up, you may trim the loose edges. Do not remove adhesive strips completely unless your health care provider tells you to do that.  · Check your incision area every day for signs of infection. Check for:  ? More redness, swelling, or pain.  ? Fluid or blood.  ? Warmth.  ? Pus or a bad smell.  Managing pain, stiffness, and swelling    · If directed, put ice on the affected area.  ? Put ice in a plastic bag.  ? Place a towel between your skin and the bag.  ? Leave the ice on for 20 minutes, 2-3 times a day.  · Move your toes often to reduce stiffness and swelling.  · Raise (elevate) the injured area above the level of your heart while you are lying down.  Activity  · Rest as told by your health care provider.  · Use your crutches or walker as told by your health care provider. Your health care provider will let you know how much weight you can put on your leg. Your health care provider will do X-rays to check bone healing. As healing progresses, you may be allowed to put more weight on your leg.  · Avoid sitting for a long time without moving. Get up to take short walks every 1-2 hours. This is important to improve blood flow and breathing. Ask for help if you feel weak or unsteady.  · Keep all your physical therapy appointments.  · Do exercises as told by your health care provider. These exercises will prevent weakness and stiffness in your hip.  · Return to your normal activities as told by your health care provider. Ask your health care provider what  activities are safe for you. It may take several months to heal completely.  · Ask your health care provider when it is safe to drive.  General instructions  · Do not use any products that contain nicotine or tobacco, such as cigarettes, e-cigarettes, and chewing tobacco. These can delay bone healing after surgery. If you need help quitting, ask your health care provider.  · Take steps to prevent falls at home, such as removing throw rugs and tripping hazards.  · Keep all follow-up visits as told by your health care provider. This is important.  Contact a health care provider if:  · You are not getting relief from your pain medicine.  · You have more redness, swelling, or pain around your incision.  · You have fluid or blood coming from your incision.  · Your incision feels warm to the touch.  · You have pus or a bad smell coming from your incision.  Get help right away if:  · You have a fever and chills.  · You have chest pain or trouble breathing.  · Your incision breaks open.  · You have severe pain that does not get better with medicine.  Summary  · After your surgery, it is normal to have some pain, swelling, and tenderness.  · Take over-the-counter and prescription medicines only as told by your health care provider.  · Follow instructions from your health care provider about how to take care of your incision. Check your incision area every day for signs of infection.  · Return to your normal activities as told by your health care provider. Ask your health care provider what activities are safe for you.  · It may take several months to heal completely. Keep all follow-up visits as told by your health care provider.  This information is not intended to replace advice given to you by your health care provider. Make sure you discuss any questions you have with your health care provider.  Document Released: 10/21/2019 Document Revised: 10/21/2019 Document Reviewed: 10/21/2019  Elsevier Patient Education © 2020  [employer]

## 2020-10-27 NOTE — PROGRESS NOTES
[facility]  Spoke To: Tila  Outcome: Facility will not accept pt. Without DC summary ALICE Green notified     @[id number]  [facility] Name[facility]  Spoke To: Tila  Outcome: Rightfaxed dc summary to Tila   [phone]

## 2020-10-27 NOTE — DISCHARGE PLANNING
1157 - transfer packet completed, given to Cristina VASQUEZ.    Anticipated Discharge Disposition: [facility]    Action: pt accepted to [facility], transport set for 1200, Dr. Aleman notified via Voalte. Pt notified at bedside, pt amenable, pt given 2nd IMM and verbalized understanding. PEDRO Slater notified of 1200 transfer.    Barriers to Discharge: none    Plan: transfer to [facility] at 1200.

## 2020-10-27 NOTE — DISCHARGE PLANNING
Spoke to Tila @ [facility]    Transport is scheduled for 10/27/20 @1200 going to [facility].    Notified Bedside RN Elyssa

## 2020-10-27 NOTE — CARE PLAN
Problem: Infection  Goal: Will remain free from infection  Outcome: PROGRESSING AS EXPECTED   Standard precautions in place, patient educated on proper hand washing, nutrition promoted, IS used   Problem: Venous Thromboembolism (VTW)/Deep Vein Thrombosis (DVT) Prevention:  Goal: Patient will participate in Venous Thrombosis (VTE)/Deep Vein Thrombosis (DVT)Prevention Measures  Outcome: PROGRESSING AS EXPECTED   Pt ambulates, medicatioon ordered

## 2020-10-27 NOTE — PROGRESS NOTES
Notified Dr. Aleman about last lab on 25 of na of 130, and wbc trending down. States still ok to go.

## 2020-12-11 ENCOUNTER — HOSPITAL ENCOUNTER (OUTPATIENT)
Dept: HOSPITAL 8 - CFH | Age: 85
Discharge: HOME | End: 2020-12-11
Attending: INTERNAL MEDICINE
Payer: MEDICARE

## 2020-12-11 DIAGNOSIS — I08.8: Primary | ICD-10-CM

## 2020-12-11 DIAGNOSIS — I47.2: ICD-10-CM

## 2020-12-11 DIAGNOSIS — I27.20: ICD-10-CM

## 2020-12-11 PROCEDURE — 93306 TTE W/DOPPLER COMPLETE: CPT

## (undated) DEVICE — SUCTION INSTRUMENT YANKAUER BULBOUS TIP W/O VENT (50EA/CA)

## (undated) DEVICE — NEPTUNE 4 PORT MANIFOLD - (20/PK)

## (undated) DEVICE — PROTECTOR ULNA NERVE - (36PR/CA)

## (undated) DEVICE — TUBING CLEARLINK DUO-VENT - C-FLO (48EA/CA)

## (undated) DEVICE — SUCTION INSTRUMENT YANKAUER OPEN TIP W/O VENT (50EA/CA)

## (undated) DEVICE — SET LEADWIRE 5 LEAD BEDSIDE DISPOSABLE ECG (1SET OF 5/EA)

## (undated) DEVICE — HEAD HOLDER JUNIOR/ADULT

## (undated) DEVICE — DRAPE IOBAN II INCISE 23X17 - (10EA/BX 4BX/CA)

## (undated) DEVICE — SUTURE 2-0 VICRYL PLUS CT-1 - 8 X 18 INCH(12/BX)

## (undated) DEVICE — GLOVE SZ 7 BIOGEL PI MICRO - PF LF (50PR/BX 4BX/CA)

## (undated) DEVICE — KIT ANESTHESIA W/CIRCUIT & 3/LT BAG W/FILTER (20EA/CA)

## (undated) DEVICE — MASK, LARYNGEAL AIRWAY #4

## (undated) DEVICE — TUBE CONNECT SUCTION CLEAR 120 X 1/4" (50EA/CA)"

## (undated) DEVICE — BLADE SURGICAL #15 - (50/BX 3BX/CA)

## (undated) DEVICE — CHLORAPREP 26 ML APPLICATOR - ORANGE TINT(25/CA)

## (undated) DEVICE — SUTURE GENERAL

## (undated) DEVICE — DRAPE 36X28IN RAD CARM BND BG - (25/CA) O

## (undated) DEVICE — TOWELS CLOTH SURGICAL - (4/PK 20PK/CA)

## (undated) DEVICE — DRAPE U ORTHOPEDIC - (10/BX)

## (undated) DEVICE — GLOVE BIOGEL INDICATOR SZ 8 SURGICAL PF LTX - (50/BX 4BX/CA)

## (undated) DEVICE — GLOVE BIOGEL PI INDICATOR SZ 8.0 SURGICAL PF LF -(50/BX 4BX/CA)

## (undated) DEVICE — ROD REAMING STERILE WITH BALL TIP 2.5MM 950MM

## (undated) DEVICE — DRAPE SURGICAL U 77X120 - (10/CA)

## (undated) DEVICE — SET EXTENSION WITH 2 PORTS (48EA/CA) ***PART #2C8610 IS A SUBSTITUTE*****

## (undated) DEVICE — ELECTRODE 850 FOAM ADHESIVE - HYDROGEL RADIOTRNSPRNT (50/PK)

## (undated) DEVICE — GOWN WARMING STANDARD FLEX - (30/CA)

## (undated) DEVICE — GVL 3 STAT DISPOSABLE - (10/BX)

## (undated) DEVICE — GOWN SURGEONS X-LARGE - DISP. (30/CA)

## (undated) DEVICE — STAPLER SKIN DISP - (6/BX 10BX/CA) VISISTAT

## (undated) DEVICE — ELECTRODE DUAL RETURN W/ CORD - (50/PK)

## (undated) DEVICE — BIT DRILL THREE FLUTED QC NEEDLE POINT 4.2MM 145MM (1TX2=2)

## (undated) DEVICE — SENSOR SPO2 NEO LNCS ADHESIVE (20/BX) SEE USER NOTES

## (undated) DEVICE — DRAPE STRLE REG TOWEL 18X24 - (10/BX 4BX/CA)"

## (undated) DEVICE — CANISTER SUCTION 3000ML MECHANICAL FILTER AUTO SHUTOFF MEDI-VAC NONSTERILE LF DISP  (40EA/CA)

## (undated) DEVICE — GLOVE BIOGEL PI ORTHO SZ 7 PF LF (40PR/BX)

## (undated) DEVICE — PENCIL ELECTSURG 10FT BTN SWH - (50/CA)

## (undated) DEVICE — TUBE E-T HI-LO CUFF 6.5MM (10EA/BX)

## (undated) DEVICE — PEN SKIN MARKER W/RULER - (50EA/BX)

## (undated) DEVICE — GLOVE SZ 8 BIOGEL PI MICRO - PF LF (50PR/BX)

## (undated) DEVICE — GLOVE BIOGEL SZ 7.5 SURGICAL PF LTX - (50PR/BX 4BX/CA)

## (undated) DEVICE — MASK ANESTHESIA ADULT  - (100/CA)

## (undated) DEVICE — DRESSING XEROFORM 1X8 - (50/BX 4BX/CA)

## (undated) DEVICE — DRAPE LARGE 3 QUARTER - (20/CA)

## (undated) DEVICE — SUTURE 0 VICRYL PLUS CT-1 - 8 X 18 INCH (12/BX)

## (undated) DEVICE — LACTATED RINGERS INJ 1000 ML - (14EA/CA 60CA/PF)

## (undated) DEVICE — WIRE GUIDE 3.2MM 400MM

## (undated) DEVICE — DETERGENT RENUZYME PLUS 10 OZ PACKET (50/BX)

## (undated) DEVICE — SYRINGE 10 ML CONTROL LL (25EA/BX 4BX/CA)